# Patient Record
Sex: MALE | Race: OTHER | NOT HISPANIC OR LATINO
[De-identification: names, ages, dates, MRNs, and addresses within clinical notes are randomized per-mention and may not be internally consistent; named-entity substitution may affect disease eponyms.]

---

## 2020-04-08 ENCOUNTER — APPOINTMENT (OUTPATIENT)
Dept: CARDIOLOGY | Facility: CLINIC | Age: 70
End: 2020-04-08

## 2020-08-12 ENCOUNTER — NON-APPOINTMENT (OUTPATIENT)
Age: 70
End: 2020-08-12

## 2020-08-12 ENCOUNTER — APPOINTMENT (OUTPATIENT)
Dept: CARDIOLOGY | Facility: CLINIC | Age: 70
End: 2020-08-12
Payer: MEDICAID

## 2020-08-12 VITALS
BODY MASS INDEX: 31.76 KG/M2 | SYSTOLIC BLOOD PRESSURE: 119 MMHG | OXYGEN SATURATION: 97 % | HEART RATE: 92 BPM | DIASTOLIC BLOOD PRESSURE: 67 MMHG | WEIGHT: 185 LBS

## 2020-08-12 DIAGNOSIS — I48.91 UNSPECIFIED ATRIAL FIBRILLATION: ICD-10-CM

## 2020-08-12 PROCEDURE — 93000 ELECTROCARDIOGRAM COMPLETE: CPT

## 2020-08-12 PROCEDURE — 99205 OFFICE O/P NEW HI 60 MIN: CPT

## 2020-08-12 NOTE — PHYSICAL EXAM
[General Appearance - Well Developed] : well developed [Normal Appearance] : normal appearance [General Appearance - Well Nourished] : well nourished [Normal Conjunctiva] : the conjunctiva exhibited no abnormalities [Normal Jugular Venous A Waves Present] : normal jugular venous A waves present [Normal Jugular Venous V Waves Present] : normal jugular venous V waves present [Heart Sounds] : normal S1 and S2 [Murmurs] : no murmurs present [] : no respiratory distress [Auscultation Breath Sounds / Voice Sounds] : lungs were clear to auscultation bilaterally [Abdomen Soft] : soft [Abdomen Tenderness] : non-tender [Abnormal Walk] : normal gait [Skin Color & Pigmentation] : normal skin color and pigmentation [No Venous Stasis] : no venous stasis [Oriented To Time, Place, And Person] : oriented to person, place, and time [FreeTextEntry1] : DP +1 Right , left DP not felt , PT bilaterally not felt

## 2020-08-12 NOTE — HISTORY OF PRESENT ILLNESS
[FreeTextEntry1] : A 69 y/o male with PMhx of CAD previously followed by Dr. Bañuelos , who is referred to me by Dr. Gonzalez because of bilateral claudication\par He c/o of 1 year of right LE claudications (bilateral leg weakness , pain and inability to walk ) followed by Left LE claudications getting progressively worse , now happens with 5-10 steps \par Denies numbness , tingling or ulcers \par U/S showed bilateral moderate to severe SFA stenosis\par He denies chest pain, SOB , palpitations, dizziness or syncope \par

## 2020-08-12 NOTE — PHYSICAL EXAM
[General Appearance - Well Developed] : well developed [Normal Appearance] : normal appearance [General Appearance - Well Nourished] : well nourished [Normal Conjunctiva] : the conjunctiva exhibited no abnormalities [Normal Jugular Venous A Waves Present] : normal jugular venous A waves present [Heart Sounds] : normal S1 and S2 [Normal Jugular Venous V Waves Present] : normal jugular venous V waves present [Murmurs] : no murmurs present [] : no respiratory distress [Auscultation Breath Sounds / Voice Sounds] : lungs were clear to auscultation bilaterally [Abdomen Soft] : soft [Abdomen Tenderness] : non-tender [Abnormal Walk] : normal gait [Skin Color & Pigmentation] : normal skin color and pigmentation [Oriented To Time, Place, And Person] : oriented to person, place, and time [No Venous Stasis] : no venous stasis [FreeTextEntry1] : DP +1 Right , left DP not felt , PT bilaterally not felt

## 2020-08-12 NOTE — HISTORY OF PRESENT ILLNESS
[FreeTextEntry1] : A 71 y/o male with PMhx of CAD previously followed by Dr. Bañuelos , who is referred to me by Dr. Gonzalez because of bilateral claudication\par He c/o of 1 year of right LE claudications (bilateral leg weakness , pain and inability to walk ) followed by Left LE claudications getting progressively worse , now happens with 5-10 steps \par Denies numbness , tingling or ulcers \par U/S showed bilateral moderate to severe SFA stenosis\par He denies chest pain, SOB , palpitations, dizziness or syncope \par

## 2020-08-12 NOTE — ASSESSMENT
[FreeTextEntry1] : Bilateral LE intermittent claudications Ayla IIB , lifestyle limiting \par will order peripheral angiogram and angioplasty/stenting \par Given long history of CAD and inability to access exertional angina given limitations from LE claudication \par I suggest coronary angiogram to be done at the same times ( hx of LAD distal disease, proximal and mid cX diffuse disease and RCA  based on prior cardiologist assessment)

## 2020-09-12 DIAGNOSIS — Z01.818 ENCOUNTER FOR OTHER PREPROCEDURAL EXAMINATION: ICD-10-CM

## 2020-09-14 ENCOUNTER — APPOINTMENT (OUTPATIENT)
Dept: DISASTER EMERGENCY | Facility: CLINIC | Age: 70
End: 2020-09-14

## 2020-09-15 LAB — SARS-COV-2 N GENE NPH QL NAA+PROBE: NOT DETECTED

## 2020-09-17 ENCOUNTER — TRANSCRIPTION ENCOUNTER (OUTPATIENT)
Age: 70
End: 2020-09-17

## 2020-09-17 ENCOUNTER — OUTPATIENT (OUTPATIENT)
Dept: OUTPATIENT SERVICES | Facility: HOSPITAL | Age: 70
LOS: 1 days | End: 2020-09-17
Payer: MEDICARE

## 2020-09-17 VITALS
DIASTOLIC BLOOD PRESSURE: 74 MMHG | RESPIRATION RATE: 18 BRPM | SYSTOLIC BLOOD PRESSURE: 144 MMHG | TEMPERATURE: 98 F | HEART RATE: 103 BPM | OXYGEN SATURATION: 99 %

## 2020-09-17 VITALS
SYSTOLIC BLOOD PRESSURE: 134 MMHG | OXYGEN SATURATION: 99 % | RESPIRATION RATE: 18 BRPM | HEART RATE: 87 BPM | DIASTOLIC BLOOD PRESSURE: 81 MMHG

## 2020-09-17 DIAGNOSIS — I25.10 ATHEROSCLEROTIC HEART DISEASE OF NATIVE CORONARY ARTERY WITHOUT ANGINA PECTORIS: ICD-10-CM

## 2020-09-17 PROCEDURE — 80048 BASIC METABOLIC PNL TOTAL CA: CPT

## 2020-09-17 PROCEDURE — 86901 BLOOD TYPING SEROLOGIC RH(D): CPT

## 2020-09-17 PROCEDURE — 93005 ELECTROCARDIOGRAM TRACING: CPT

## 2020-09-17 PROCEDURE — C1769: CPT

## 2020-09-17 PROCEDURE — 86850 RBC ANTIBODY SCREEN: CPT

## 2020-09-17 PROCEDURE — 85610 PROTHROMBIN TIME: CPT

## 2020-09-17 PROCEDURE — C1894: CPT

## 2020-09-17 PROCEDURE — 85730 THROMBOPLASTIN TIME PARTIAL: CPT

## 2020-09-17 PROCEDURE — 93010 ELECTROCARDIOGRAM REPORT: CPT

## 2020-09-17 PROCEDURE — 99152 MOD SED SAME PHYS/QHP 5/>YRS: CPT

## 2020-09-17 PROCEDURE — 36415 COLL VENOUS BLD VENIPUNCTURE: CPT

## 2020-09-17 PROCEDURE — C1887: CPT

## 2020-09-17 PROCEDURE — 85025 COMPLETE CBC W/AUTO DIFF WBC: CPT

## 2020-09-17 PROCEDURE — 93458 L HRT ARTERY/VENTRICLE ANGIO: CPT

## 2020-09-17 PROCEDURE — 86900 BLOOD TYPING SEROLOGIC ABO: CPT

## 2020-09-17 PROCEDURE — 99153 MOD SED SAME PHYS/QHP EA: CPT

## 2020-09-17 RX ORDER — SIMVASTATIN 20 MG/1
1 TABLET, FILM COATED ORAL
Qty: 0 | Refills: 0 | DISCHARGE

## 2020-09-17 RX ORDER — WARFARIN SODIUM 2.5 MG/1
0.5 TABLET ORAL
Qty: 0 | Refills: 0 | DISCHARGE

## 2020-09-17 RX ORDER — WARFARIN SODIUM 2.5 MG/1
1 TABLET ORAL
Qty: 0 | Refills: 0 | DISCHARGE

## 2020-09-17 RX ORDER — WARFARIN SODIUM 2.5 MG/1
0.5 TABLET ORAL
Qty: 15 | Refills: 0
Start: 2020-09-17 | End: 2020-10-16

## 2020-09-17 RX ORDER — WARFARIN SODIUM 2.5 MG/1
1 TABLET ORAL
Qty: 30 | Refills: 0
Start: 2020-09-17 | End: 2020-10-16

## 2020-09-17 RX ORDER — SIMVASTATIN 20 MG/1
1 TABLET, FILM COATED ORAL
Qty: 90 | Refills: 2
Start: 2020-09-17 | End: 2021-06-13

## 2020-09-17 NOTE — H&P PST ADULT - NSICDXPASTMEDICALHX_GEN_ALL_CORE_FT
PAST MEDICAL HISTORY:  Atrial fibrillation     CAD (coronary artery disease)     HLD (hyperlipidemia)     HTN (hypertension)

## 2020-09-17 NOTE — DISCHARGE NOTE PROVIDER - HOSPITAL COURSE
69 yo male with PMHX of CAD (dLAD, porx and mid Circumflex diffuse disease and RCA  - prev cardiology assessment; used to follow with cardiology at [facility]), Atrial fibrillation on Coumadin, HTN, HLD who pre was referred by Dr. Gonzalez for evaluation of bilateral claudication. Patient has complained of right and left lower extremity weakness, pain and inability to walk which has been progressively worse. Per patient symptoms started on the right lower extremity and has progressed to the left lower extremity with associated limiting symptoms. Patient is not able to walk long distances and currently has not been able to walk in the past few days. Last documented ischemic evaluation (in [place]) 4/2011 was significant for normal NST and Normal LV systolic function on echocardiogram EF 60%. Per review of outpatient clinical documentation, pt had US LE which showed bilateral moderate to sever SFA stenosis. Patient to be further evaluated for bilateral lower extremity Bifemoral Angiogram, Iliac, Femoropopliteal Angioplasty / poss stent at a later date.  He is now scheduled for [facility] to eval progression of CAD.       s/p Left Heart Catheterization via RRA approach with Dr. Stein which showed:  Closed RCA, 80% Circumflex, Normal LAD - medical management recommended

## 2020-09-17 NOTE — H&P PST ADULT - ASSESSMENT
71 yo male with PMHX of CAD (dLAD, porx and mid Circumflex diffuse disease and RCA  - prev cardiology assessment; used to follow with cardiology at [facility]), Atrial fibrillation on Coumadin, HTN, HLD who pre was referred by Dr. Gonzalez for evaluation of bilateral claudication. Patient has complained of right and left lower extremity weakness, pain and inability to walk which has been progressively worse. Per patient symptoms started on the right lower extremity and has progressed to the left lower extremity with associated limiting symptoms. Patient is not able to walk long distances and currently has not been able to walk in the past few days. Last documented ischemic evaluation (in [place]) 4/2011 was significant for normal NST and Normal LV systolic function on echocardiogram EF 60%. Per review of outpatient clinical documentation, pt had US LE which showed bilateral moderate to sever SFA stenosis. He is now scheduled for bilateral lower extremity angiogram/ poss stent with Dr. Jean.        -Patient seen and examined  -Labs and EKG reviewed   -Pre-procedure teaching completed with patient, questions answered   -Informed consent to be obtained by attending   -received Asprin prior to procedure       69 yo male with PMHX of CAD (dLAD, porx and mid Circumflex diffuse disease and RCA  - prev cardiology assessment; used to follow with cardiology at [facility]), Atrial fibrillation on Coumadin, HTN, HLD who pre was referred by Dr. Gonzalez for evaluation of bilateral claudication. Patient has complained of right and left lower extremity weakness, pain and inability to walk which has been progressively worse. Per patient symptoms started on the right lower extremity and has progressed to the left lower extremity with associated limiting symptoms. Patient is not able to walk long distances and currently has not been able to walk in the past few days. Last documented ischemic evaluation (in [place]) 4/2011 was significant for normal NST and Normal LV systolic function on echocardiogram EF 60%. Per review of outpatient clinical documentation, pt had US LE which showed bilateral moderate to sever SFA stenosis. Patient to be further evaluated for bilateral lower extremity Bifemoral Angiogram, Iliac, Femoropopliteal Angioplasty / poss stent at a later date.  He is now scheduled for [facility] to eval progression of CAD.       -Patient seen and examined  -Labs and EKG reviewed   -Pre-procedure teaching completed with patient, questions answered   -Informed consent to be obtained by attending   -received Asprin prior to procedure

## 2020-09-17 NOTE — PROGRESS NOTE ADULT - SUBJECTIVE AND OBJECTIVE BOX
Department of Cardiology                                                  [facility]/[place] [address] [address]                                              Telephone: [phone]. Fax:[phone]                                                         Post Procedure Cardiac Cath NP Note       Narrative:    69 yo male with PMHX of CAD (dLAD, porx and mid Circumflex diffuse disease and RCA  - prev cardiology assessment; used to follow with cardiology at [facility]), Atrial fibrillation on Coumadin, HTN, HLD who pre was referred by Dr. Gonzalez for evaluation of bilateral claudication. Patient has complained of right and left lower extremity weakness, pain and inability to walk which has been progressively worse. Per patient symptoms started on the right lower extremity and has progressed to the left lower extremity with associated limiting symptoms. Patient is not able to walk long distances and currently has not been able to walk in the past few days. Last documented ischemic evaluation (in [place]) 4/2011 was significant for normal NST and Normal LV systolic function on echocardiogram EF 60%. Per review of outpatient clinical documentation, pt had US LE which showed bilateral moderate to sever SFA stenosis. Patient to be further evaluated for bilateral lower extremity Bifemoral Angiogram, Iliac, Femoropopliteal Angioplasty / poss stent at a later date.  He is now scheduled for [facility] to eval progression of CAD.     s/p Left Heart Catheterization via RRA approach with Dr. Jean              PAST MEDICAL & SURGICAL HISTORY:  HLD (hyperlipidemia)    HTN (hypertension)  CAD (coronary artery disease)  Atrial fibrillation      FAMILY HISTORY:  Family history of early CAD      Home Medications:  aspirin 300 mg oral tablet:  (17 Sep 2020 09:27)  isosorbide mononitrate 30 mg oral tablet, extended release: 1 tab(s) orally once a day (in the morning) (17 Sep 2020 09:27)  metoprolol tartrate 25 mg oral tablet: 0.5 tab(s) orally 2 times a day (17 Sep 2020 09:27)  pantoprazole 40 mg oral delayed release tablet: 1 tab(s) orally once a day (17 Sep 2020 09:27)  simvastatin 40 mg oral tablet: 1 tab(s) orally once a day (at bedtime) (17 Sep 2020 09:27)  warfarin 1 mg oral tablet: 1 tab(s) orally once a day (17 Sep 2020 09:27)  warfarin 5 mg oral tablet: 0.5 tab(s) orally once a day (17 Sep 2020 09:27)                     16.1   9.84  )-----------( 199      ( 17 Sep 2020 09:22 )             49.2     09-17    138  |  104  |  13.0  ----------------------------<  111<H>  4.7   |  23.0  |  0.74    Ca    9.3      17 Sep 2020 09:22      PT/INR - ( 17 Sep 2020 09:22 )   PT: 15.3 sec;   INR: 1.34 ratio    PTT - ( 17 Sep 2020 09:22 )  PTT:31.3 sec       PHYSICAL EXAM:  Vital Signs last 24 Hours   T(C): 36.7 (09-17-20 @ 09:32), Max: 36.7 (09-17-20 @ 09:32)  HR: 103 (09-17-20 @ 09:32) (103 - 103)  BP: 144/74 (09-17-20 @ 09:32) (144/74 - 144/74)  RR: 18 (09-17-20 @ 09:32) (18 - 18)  SpO2: 99% (09-17-20 @ 09:32) (99% - 99%)      Gen: Appears well in NAD  HEENT:  (-)icterus (-)pallor  CV: N S1 S2, RRR, no r/m/g,  (+)2 Pulses B/l  Resp:  Clear to auscultation B/L, normal effort  GI: (+) BS Soft, NT, ND  Lymph:  (-)Edema, (-)obvious lymphadenopathy  Skin: Warm to touch, Normal turgor  Psych: Appropriate mood and affect    TELEMETRY:    DIAGNOSTICS:    < from: Transthoracic Echocardiogram w/ Doppler (04.08.11 @ 08:17) >  Conclusions:  1. Mitral annular calcification, otherwise normal mitral  valve. Mild mitral regurgitation.  2. Normal trileaflet aortic valve. No aortic valve  regurgitation seen.  3. Mildly dilated left atrium for body surface area.  LA  volume index = 32 cc/m2.  4. Increased relative wall thickness with normal left  ventricular mass index, consistent with concentric left  ventricular remodeling.  5. Normal left ventricular systolic function. No segmental  wall motion abnormalities.  Ejection fraction 60%.  6. Inadequate tricuspid regurgitation Doppler envelope  precludes estimation of RVSP.  ------------------------------------------------------------------------  Confirmed on  4/8/2011 - 09:59:10 by Radha Suárez M.D.    < end of copied text >      Left Heart Catheterization - final report pending       ASSESSMENT AND PLAN:           -post cardiac cath orders  -radial or groin precautions  -continue current medical therapy  -DAPT (ASA and plavix)  -statin  -BB  -follow up outpt in 2 weeks Department of Cardiology                                                        [facility]/[place] [address] [address] [facility]                                                    Telephone: [phone]. Fax:[phone]                                                         Post Procedure Cardiac Cath NP Note       Narrative:    69 yo male with PMHX of CAD (dLAD, porx and mid Circumflex diffuse disease and RCA  - prev cardiology assessment; used to follow with cardiology at [facility]), Atrial fibrillation on Coumadin, HTN, HLD who pre was referred by Dr. Gonzalez for evaluation of bilateral claudication. Patient has complained of right and left lower extremity weakness, pain and inability to walk which has been progressively worse. Per patient symptoms started on the right lower extremity and has progressed to the left lower extremity with associated limiting symptoms. Patient is not able to walk long distances and currently has not been able to walk in the past few days. Last documented ischemic evaluation (in [place]) 4/2011 was significant for normal NST and Normal LV systolic function on echocardiogram EF 60%. Per review of outpatient clinical documentation, pt had US LE which showed bilateral moderate to sever SFA stenosis. Patient to be further evaluated for bilateral lower extremity Bifemoral Angiogram, Iliac, Femoropopliteal Angioplasty / poss stent at a later date.  He is now scheduled for [facility] to eval progression of CAD.     s/p Left Heart Catheterization via RRA approach with Dr. Stein which showed:  Closed RCA, 80% Circumflex, Normal LAD - medical management recommended   Medications used:  Versed IV 1 mg, FEntanyl IV 25 mcg, Verapamil IA 5 mg, Heparin 4000 units  Contrast used:  Omnipaque 45 ml                PAST MEDICAL & SURGICAL HISTORY:  HLD (hyperlipidemia)    HTN (hypertension)  CAD (coronary artery disease)  Atrial fibrillation      FAMILY HISTORY:  Family history of early CAD      Home Medications:  aspirin 300 mg oral tablet:  (17 Sep 2020 09:27)  isosorbide mononitrate 30 mg oral tablet, extended release: 1 tab(s) orally once a day (in the morning) (17 Sep 2020 09:27)  metoprolol tartrate 25 mg oral tablet: 0.5 tab(s) orally 2 times a day (17 Sep 2020 09:27)  pantoprazole 40 mg oral delayed release tablet: 1 tab(s) orally once a day (17 Sep 2020 09:27)  simvastatin 40 mg oral tablet: 1 tab(s) orally once a day (at bedtime) (17 Sep 2020 09:27)  warfarin 1 mg oral tablet: 1 tab(s) orally once a day (17 Sep 2020 09:27)  warfarin 5 mg oral tablet: 0.5 tab(s) orally once a day (17 Sep 2020 09:27)                     16.1   9.84  )-----------( 199      ( 17 Sep 2020 09:22 )             49.2     09-17    138  |  104  |  13.0  ----------------------------<  111<H>  4.7   |  23.0  |  0.74    Ca    9.3      17 Sep 2020 09:22      PT/INR - ( 17 Sep 2020 09:22 )   PT: 15.3 sec;   INR: 1.34 ratio    PTT - ( 17 Sep 2020 09:22 )  PTT:31.3 sec       PHYSICAL EXAM:  Vital Signs last 24 Hours   T(C): 36.7 (09-17-20 @ 09:32), Max: 36.7 (09-17-20 @ 09:32)  HR: 103 (09-17-20 @ 09:32) (103 - 103)  BP: 144/74 (09-17-20 @ 09:32) (144/74 - 144/74)  RR: 18 (09-17-20 @ 09:32) (18 - 18)  SpO2: 99% (09-17-20 @ 09:32) (99% - 99%)      Gen: Appears well in NAD  HEENT:  (-)icterus (-)pallor  CV: N S1 S2, RRR, no r/m/g,  (+)2 Pulses B/l  Resp:  Clear to auscultation B/L, normal effort  GI: (+) BS Soft, NT, ND  Lymph:  (-)Edema, (-)obvious lymphadenopathy  Skin: Warm to touch, Normal turgor  Psych: Appropriate mood and affect    TELEMETRY:    DIAGNOSTICS:    < from: Transthoracic Echocardiogram w/ Doppler (04.08.11 @ 08:17) >  Conclusions:  1. Mitral annular calcification, otherwise normal mitral  valve. Mild mitral regurgitation.  2. Normal trileaflet aortic valve. No aortic valve  regurgitation seen.  3. Mildly dilated left atrium for body surface area.  LA  volume index = 32 cc/m2.  4. Increased relative wall thickness with normal left  ventricular mass index, consistent with concentric left  ventricular remodeling.  5. Normal left ventricular systolic function. No segmental  wall motion abnormalities.  Ejection fraction 60%.  6. Inadequate tricuspid regurgitation Doppler envelope  precludes estimation of RVSP.  ------------------------------------------------------------------------  Confirmed on  4/8/2011 - 09:59:10 by Radha Suárez M.D.    < end of copied text >      Left Heart Catheterization - final report pending       ASSESSMENT AND PLAN:           -post cardiac cath orders  -radial or groin precautions  -continue current medical therapy  -DAPT (ASA and plavix)  -statin  -BB  -follow up outpt in 2 weeks Department of Cardiology                                                        [facility]/[place] [address] [facility]                                                    Telephone: [phone]. Fax:[phone]                                                         Post Procedure Cardiac Cath NP Note       Narrative:    71 yo male with PMHX of CAD (dLAD, porx and mid Circumflex diffuse disease and RCA  - prev cardiology assessment; used to follow with cardiology at [facility]), Atrial fibrillation on Coumadin, HTN, HLD who pre was referred by Dr. Gonzalez for evaluation of bilateral claudication. Patient has complained of right and left lower extremity weakness, pain and inability to walk which has been progressively worse. Per patient symptoms started on the right lower extremity and has progressed to the left lower extremity with associated limiting symptoms. Patient is not able to walk long distances and currently has not been able to walk in the past few days. Last documented ischemic evaluation (in [place]) 4/2011 was significant for normal NST and Normal LV systolic function on echocardiogram EF 60%. Per review of outpatient clinical documentation, pt had US LE which showed bilateral moderate to sever SFA stenosis. Patient to be further evaluated for bilateral lower extremity Bifemoral Angiogram, Iliac, Femoropopliteal Angioplasty / poss stent at a later date.  He is now scheduled for [facility] to eval progression of CAD.       s/p Left Heart Catheterization via RRA approach with Dr. Stein which showed:  Closed RCA, 80% Circumflex, Normal LAD - medical management recommended   Medications used:  Versed IV 1 mg, FEntanyl IV 25 mcg, Verapamil IA 5 mg, Heparin 4000 units  Contrast used:  Omnipaque 45 ml  Right Radial Artery approach/ RRA band in situ           PAST MEDICAL & SURGICAL HISTORY:  HLD (hyperlipidemia)    HTN (hypertension)  CAD (coronary artery disease)  Atrial fibrillation      FAMILY HISTORY:  Family history of early CAD      Home Medications:  aspirin 300 mg oral tablet:  (17 Sep 2020 09:27)  isosorbide mononitrate 30 mg oral tablet, extended release: 1 tab(s) orally once a day (in the morning) (17 Sep 2020 09:27)  metoprolol tartrate 25 mg oral tablet: 0.5 tab(s) orally 2 times a day (17 Sep 2020 09:27)  pantoprazole 40 mg oral delayed release tablet: 1 tab(s) orally once a day (17 Sep 2020 09:27)  simvastatin 40 mg oral tablet: 1 tab(s) orally once a day (at bedtime) (17 Sep 2020 09:27)  warfarin 1 mg oral tablet: 1 tab(s) orally once a day (17 Sep 2020 09:27)  warfarin 5 mg oral tablet: 0.5 tab(s) orally once a day (17 Sep 2020 09:27)                     16.1   9.84  )-----------( 199      ( 17 Sep 2020 09:22 )             49.2     09-17    138  |  104  |  13.0  ----------------------------<  111<H>  4.7   |  23.0  |  0.74    Ca    9.3      17 Sep 2020 09:22      PT/INR - ( 17 Sep 2020 09:22 )   PT: 15.3 sec;   INR: 1.34 ratio    PTT - ( 17 Sep 2020 09:22 )  PTT:31.3 sec       PHYSICAL EXAM:  Vital Signs last 24 Hours   T(C): 36.7 (09-17-20 @ 09:32), Max: 36.7 (09-17-20 @ 09:32)  HR: 103 (09-17-20 @ 09:32) (103 - 103)  BP: 144/74 (09-17-20 @ 09:32) (144/74 - 144/74)  RR: 18 (09-17-20 @ 09:32) (18 - 18)  SpO2: 99% (09-17-20 @ 09:32) (99% - 99%)      Gen: Appears well in NAD  HEENT:  (-)icterus (-)pallor  CV: N S1 S2, RRR, no r/m/g,  (+)2 Pulses B/l  Resp:  Clear to auscultation B/L, normal effort  GI: (+) BS Soft, NT, ND  Lymph:  (-)Edema, (-)obvious lymphadenopathy  Skin: Warm to touch, Normal turgor  Psych: Appropriate mood and affect    TELEMETRY:  Atrial Fibrillation     DIAGNOSTICS:    < from: Transthoracic Echocardiogram w/ Doppler (04.08.11 @ 08:17) >  Conclusions:  1. Mitral annular calcification, otherwise normal mitral  valve. Mild mitral regurgitation.  2. Normal trileaflet aortic valve. No aortic valve  regurgitation seen.  3. Mildly dilated left atrium for body surface area.  LA  volume index = 32 cc/m2.  4. Increased relative wall thickness with normal left  ventricular mass index, consistent with concentric left  ventricular remodeling.  5. Normal left ventricular systolic function. No segmental  wall motion abnormalities.  Ejection fraction 60%.  6. Inadequate tricuspid regurgitation Doppler envelope  precludes estimation of RVSP.  ------------------------------------------------------------------------  Confirmed on  4/8/2011 - 09:59:10 by Radha Suárez M.D.    < end of copied text >      Left Heart Catheterization - final report pending       ASSESSMENT AND PLAN:    71 yo male with PMHX of CAD (dLAD, porx and mid Circumflex diffuse disease and RCA  - prev cardiology assessment; used to follow with cardiology at [facility]), Atrial fibrillation on Coumadin, HTN, HLD who pre was referred by Dr. Gnozalez for evaluation of bilateral claudication. Patient has complained of right and left lower extremity weakness, pain and inability to walk which has been progressively worse. Per patient symptoms started on the right lower extremity and has progressed to the left lower extremity with associated limiting symptoms. Patient is not able to walk long distances and currently has not been able to walk in the past few days. Last documented ischemic evaluation (in [place]) 4/2011 was significant for normal NST and Normal LV systolic function on echocardiogram EF 60%. Per review of outpatient clinical documentation, pt had US LE which showed bilateral moderate to sever SFA stenosis. He is now scheduled for [facility] to eval progression of CAD.       -tolerated procedure well      -post cardiac cath orders  -s/p LHC which showed known CAD, medical management recommended   -right radial precautions;  RRA band in place to be removed at 1630  -outpatient evaluation of bilateral lower extremity Bifemoral Angiogram, Iliac, Femoropopliteal Angioplasty / poss stent at a later date  -follow up with Dr. Jean outpatient for LE angiogram

## 2020-09-17 NOTE — DISCHARGE NOTE PROVIDER - NSDCFUADDAPPT_GEN_ALL_CORE_FT
Please call Dr. Jean office to scheduled bilateral lower extremity angiogram.  Call Dr. Cortez office to schedule cardiology follow up.

## 2020-09-17 NOTE — DISCHARGE NOTE PROVIDER - NSDCFUADDINST_GEN_ALL_CORE_FT
Restricted use with no heavy lifting of affected arm for 48 hours.  No submerging the arm in water for 48 hours.  You may start showering today.  Call your doctor for any bleeding, swelling, loss of sensation in the hand or fingers, or fingers turning blue.  If heavy bleeding or large lumps form, hold pressure at the spot and come to the Emergency Room.    patient not a candidate for cardiac rehab secondary to:  No stents placed, medical management recommended

## 2020-09-17 NOTE — DISCHARGE NOTE PROVIDER - NSDCACTIVITY_GEN_ALL_CORE
Walking - Outdoors allowed/Showering allowed/Walking - Indoors allowed/Stairs allowed/Do not drive or operate machinery

## 2020-09-17 NOTE — H&P PST ADULT - REASON FOR ADMISSION
Bifemoral Angiogram Bifemoral Angiogram, Iliac, Femoropopliteal Angioplasty Left Heart Catheterization

## 2020-09-17 NOTE — H&P PST ADULT - RS GEN PE MLT RESP DETAILS PC
respirations non-labored/normal/airway patent/breath sounds equal/no rhonchi/clear to auscultation bilaterally/no rales

## 2020-09-17 NOTE — DISCHARGE NOTE PROVIDER - NSDCCPCAREPLAN_GEN_ALL_CORE_FT
PRINCIPAL DISCHARGE DIAGNOSIS  Diagnosis: Status post left heart catheterization (LHC)  Assessment and Plan of Treatment: Moderate CAD   Medical management recommended  High Dose statin ordered      SECONDARY DISCHARGE DIAGNOSES  Diagnosis: Claudication  Assessment and Plan of Treatment:

## 2020-09-17 NOTE — DISCHARGE NOTE PROVIDER - CARE PROVIDER_API CALL
Prashant Jean  CARDIOVASCULAR DISEASE  [address] [facility], [address] [id number]  Phone: [phone]  Fax: [phone]  Follow Up Time:     Nicolas Cortez  CARDIOVASCULAR DISEASE  [address] [place] [address], [address] [address]  Phone: [phone]  Fax: [phone]  Follow Up Time:

## 2020-09-17 NOTE — DISCHARGE NOTE NURSING/CASE MANAGEMENT/SOCIAL WORK - PATIENT PORTAL LINK FT
You can access the FollowMyHealth Patient Portal offered by [facility] by registering at the following website: [email or web address]. By joining [employer]’s FollowMyHealth portal, you will also be able to view your health information using other applications (apps) compatible with our system.

## 2020-09-17 NOTE — DISCHARGE NOTE PROVIDER - NSDCMRMEDTOKEN_GEN_ALL_CORE_FT
aspirin 300 mg oral tablet:   isosorbide mononitrate 30 mg oral tablet, extended release: 1 tab(s) orally once a day (in the morning)  metoprolol tartrate 25 mg oral tablet: 0.5 tab(s) orally 2 times a day  pantoprazole 40 mg oral delayed release tablet: 1 tab(s) orally once a day  simvastatin 40 mg oral tablet: 1 tab(s) orally once a day (at bedtime)  warfarin 1 mg oral tablet: 1 tab(s) orally once a day  warfarin 5 mg oral tablet: 0.5 tab(s) orally once a day   aspirin 300 mg oral tablet:   isosorbide mononitrate 30 mg oral tablet, extended release: 1 tab(s) orally once a day (in the morning)  metoprolol tartrate 25 mg oral tablet: 0.5 tab(s) orally 2 times a day  pantoprazole 40 mg oral delayed release tablet: 1 tab(s) orally once a day  simvastatin 80 mg oral tablet: 1 tab(s) orally once a day (at bedtime)  warfarin 1 mg oral tablet: 1 tab(s) orally once a day  warfarin 5 mg oral tablet: 0.5 tab(s) orally once a day

## 2020-09-17 NOTE — H&P PST ADULT - HISTORY OF PRESENT ILLNESS
Narrative:     69 yo male with PMHX of MI, CAD, Atrial fibrillation on Coumadin, HTN, HLD who pre was referred by Dr. Gonzalez for evaluation of bilateral claudication. Patient has complained of right and left lower extremity weakness, pain and inability to walk which has been progressively worse     ASA  Mallampati  GFR  Creat  Bleeding Risk  COVID-19  negative 9/15       Symptoms:        Angina (Class):        Ischemic Symptoms:     Heart Failure:        Systolic/Diastolic/Combined:        NYHA Class (within 2 weeks):     Assessment of LVEF (Must be within 6 months):       EF:  55%        Assessed by:  Echocardiogram        Date:   2/6/20     Prior Cardiac Interventions (LHC, stents, CABG):       PCI's (Date, Stents, Vessels):          CABG (Date, Grafts):     Noninvasive Testing:   Stress Test: Date:        Protocol:        Duration of Exercise:        Symptoms:        EKG Changes:        DTS:        Myocardial Imaging:        Risk Assessment (Low, Medium, High):     Echo (Date, Findings):     Antianginal Therapies:        Beta Blockers:         Calcium Channel Blockers:        Long Acting Nitrates:        Ranexa:     Associated Risk Factors:        Cerebrovascular Disease: N/A       Chronic Lung Disease: N/A       Peripheral Arterial Disease: N/A       Chronic Kidney Disease (if yes, what is GFR): N/A       Uncontrolled Diabetes (if yes, what is HgbA1C or FBS): N/A       Poorly Controlled Hypertension (if yes, what is SBP): N/A       Morbid Obesity (if yes, what is BMI): N/A       History of Recent Ventricular Arrhythmia: N/A       Inability to Ambulate Safely: N/A       Need for Therapeutic Anticoagulation: N/A       Antiplatelet or Contrast Allergy: N/A Narrative:     69 yo male with PMHX of CAD (dLAD, porx and mid Circumflex diffuse disease and RCA  - prev cardiology assessment; used to follow with Dr. Bañuelos at [facility]), Atrial fibrillation on Coumadin, HTN, HLD who pre was referred by Dr. Gonzalez for evaluation of bilateral claudication. Patient has complained of right and left lower extremity weakness, pain and inability to walk which has been progressively worse. Per patient symptoms started on the right lower extremity and has progressed to the left lower extremity with associated limiting symptoms. Patient is not able to walk long distances and currently has not been able to walk in the past few days. Last documented ischemic evaluation (in [place]) 4/2011 was significant for normal NST and Normal LV systolic function on echocardiogram EF 60%. Per review of outpatient clinical documentation, pt had US LE which showed bilateral moderate to sever SFA stenosis. He is now scheduled for bilateral lower extremity angiogram/ poss stent with Dr. Jean.      ASA  2`  Mallampati   2  GFR  94  Creat  0.74  Bleeding Risk   1.3%  COVID-19  negative 9/15       Symptoms:        Angina (Class): No        Ischemic Symptoms:   No     Heart Failure:        Systolic/Diastolic/Combined:   No        NYHA Class (within 2 weeks):     Assessment of LVEF (Must be within 6 months):       EF:  55%        Assessed by:  Echocardiogram        Date:   2/6/20     < from: Transthoracic Echocardiogram w/ Doppler (04.08.11 @ 08:17) >  Conclusions:  1. Mitral annular calcification, otherwise normal mitral  valve. Mild mitral regurgitation.  2. Normal trileaflet aortic valve. No aortic valve  regurgitation seen.  3. Mildly dilated left atrium for body surface area.  LA  volume index = 32 cc/m2.  4. Increased relative wall thickness with normal left  ventricular mass index, consistent with concentric left  ventricular remodeling.  5. Normal left ventricular systolic function. No segmental  wall motion abnormalities.  Ejection fraction 60%.  6. Inadequate tricuspid regurgitation Doppler envelope  precludes estimation of RVSP.  ------------------------------------------------------------------------  Confirmed on  4/8/2011 - 09:59:10 by Radha Suárez M.D.    < end of copied text >    Prior Cardiac Interventions (LHC, stents, CABG):       PCI's (Date, Stents, Vessels):          CABG (Date, Grafts):     Noninvasive Testing:   Stress Test: Date:   4/2011        Protocol:        Duration of Exercise:        Symptoms:        EKG Changes:        DTS:        Myocardial Imaging:   Normal study; no evidence of infarct or ischemia; small mild defect in basal inferiro wall suggestive of diaphragmatic attenuation         Risk Assessment (Low, Medium, High):     Antianginal Therapies:        Beta Blockers:         Calcium Channel Blockers:        Long Acting Nitrates:        Ranexa:     Associated Risk Factors:        Cerebrovascular Disease: N/A       Chronic Lung Disease: N/A       Peripheral Arterial Disease: N/A       Chronic Kidney Disease (if yes, what is GFR): N/A       Uncontrolled Diabetes (if yes, what is HgbA1C or FBS): N/A       Poorly Controlled Hypertension (if yes, what is SBP): N/A       Morbid Obesity (if yes, what is BMI): N/A       History of Recent Ventricular Arrhythmia: N/A       Inability to Ambulate Safely: N/A       Need for Therapeutic Anticoagulation: N/A       Antiplatelet or Contrast Allergy: N/A Narrative:     69 yo male with PMHX of CAD (dLAD, porx and mid Circumflex diffuse disease and RCA  - prev cardiology assessment; used to follow with cardiology at [facility]), Atrial fibrillation on Coumadin, HTN, HLD who pre was referred by Dr. Gonzalez for evaluation of bilateral claudication. Patient has complained of right and left lower extremity weakness, pain and inability to walk which has been progressively worse. Per patient symptoms started on the right lower extremity and has progressed to the left lower extremity with associated limiting symptoms. Patient is not able to walk long distances and currently has not been able to walk in the past few days. Last documented ischemic evaluation (in [place]) 4/2011 was significant for normal NST and Normal LV systolic function on echocardiogram EF 60%. Per review of outpatient clinical documentation, pt had US LE which showed bilateral moderate to sever SFA stenosis. He is now scheduled for bilateral lower extremity angiogram/ poss stent with Dr. Jean.      ASA  2`  Mallampati   2  GFR  94  Creat  0.74  Bleeding Risk   1.3%  COVID-19  negative 9/15       Symptoms:        Angina (Class): No        Ischemic Symptoms:   No     Heart Failure:        Systolic/Diastolic/Combined:   No        NYHA Class (within 2 weeks):     Assessment of LVEF (Must be within 6 months):       EF:  55%        Assessed by:  Echocardiogram        Date:   2/6/20     < from: Transthoracic Echocardiogram w/ Doppler (04.08.11 @ 08:17) >  Conclusions:  1. Mitral annular calcification, otherwise normal mitral  valve. Mild mitral regurgitation.  2. Normal trileaflet aortic valve. No aortic valve  regurgitation seen.  3. Mildly dilated left atrium for body surface area.  LA  volume index = 32 cc/m2.  4. Increased relative wall thickness with normal left  ventricular mass index, consistent with concentric left  ventricular remodeling.  5. Normal left ventricular systolic function. No segmental  wall motion abnormalities.  Ejection fraction 60%.  6. Inadequate tricuspid regurgitation Doppler envelope  precludes estimation of RVSP.  ------------------------------------------------------------------------  Confirmed on  4/8/2011 - 09:59:10 by Radha Suárez M.D.    < end of copied text >    Prior Cardiac Interventions (LHC, stents, CABG):       PCI's (Date, Stents, Vessels):          CABG (Date, Grafts):     Noninvasive Testing:   Stress Test: Date:   4/2011        Protocol:        Duration of Exercise:        Symptoms:        EKG Changes:        DTS:        Myocardial Imaging:   Normal study; no evidence of infarct or ischemia; small mild defect in basal inferiro wall suggestive of diaphragmatic attenuation         Risk Assessment (Low, Medium, High):     Antianginal Therapies:        Beta Blockers:         Calcium Channel Blockers:        Long Acting Nitrates:        Ranexa:     Associated Risk Factors:        Cerebrovascular Disease: N/A       Chronic Lung Disease: N/A       Peripheral Arterial Disease: N/A       Chronic Kidney Disease (if yes, what is GFR): N/A       Uncontrolled Diabetes (if yes, what is HgbA1C or FBS): N/A       Poorly Controlled Hypertension (if yes, what is SBP): N/A       Morbid Obesity (if yes, what is BMI): N/A       History of Recent Ventricular Arrhythmia: N/A       Inability to Ambulate Safely: N/A       Need for Therapeutic Anticoagulation: N/A       Antiplatelet or Contrast Allergy: N/A Narrative:     71 yo male with PMHX of CAD (dLAD, porx and mid Circumflex diffuse disease and RCA  - prev cardiology assessment; used to follow with cardiology at [facility]), Atrial fibrillation on Coumadin, HTN, HLD who pre was referred by Dr. Gonzalez for evaluation of bilateral claudication. Patient has complained of right and left lower extremity weakness, pain and inability to walk which has been progressively worse. Per patient symptoms started on the right lower extremity and has progressed to the left lower extremity with associated limiting symptoms. Patient is not able to walk long distances and currently has not been able to walk in the past few days. Last documented ischemic evaluation (in [place]) 4/2011 was significant for normal NST and Normal LV systolic function on echocardiogram EF 60%. Per review of outpatient clinical documentation, pt had US LE which showed bilateral moderate to sever SFA stenosis. Patient to be further evaluated for bilateral lower extremity Bifemoral Angiogram, Iliac, Femoropopliteal Angioplasty / poss stent at a later date.  He is now scheduled for [facility] to eval progression of CAD.       ASA  2`  Mallampati   2  GFR  94  Creat  0.74  Bleeding Risk   1.3%  COVID-19  negative 9/15       Symptoms:        Angina (Class): No        Ischemic Symptoms:   No     Heart Failure:        Systolic/Diastolic/Combined:   No        NYHA Class (within 2 weeks):     Assessment of LVEF (Must be within 6 months):       EF:  55%        Assessed by:  Echocardiogram        Date:   2/6/20     < from: Transthoracic Echocardiogram w/ Doppler (04.08.11 @ 08:17) >  Conclusions:  1. Mitral annular calcification, otherwise normal mitral  valve. Mild mitral regurgitation.  2. Normal trileaflet aortic valve. No aortic valve  regurgitation seen.  3. Mildly dilated left atrium for body surface area.  LA  volume index = 32 cc/m2.  4. Increased relative wall thickness with normal left  ventricular mass index, consistent with concentric left  ventricular remodeling.  5. Normal left ventricular systolic function. No segmental  wall motion abnormalities.  Ejection fraction 60%.  6. Inadequate tricuspid regurgitation Doppler envelope  precludes estimation of RVSP.  ------------------------------------------------------------------------  Confirmed on  4/8/2011 - 09:59:10 by Radha Suárez M.D.    < end of copied text >    Prior Cardiac Interventions (LHC, stents, CABG):       PCI's (Date, Stents, Vessels):          CABG (Date, Grafts):     Noninvasive Testing:   Stress Test: Date:   4/2011        Protocol:        Duration of Exercise:        Symptoms:        EKG Changes:        DTS:        Myocardial Imaging:   Normal study; no evidence of infarct or ischemia; small mild defect in basal inferiro wall suggestive of diaphragmatic attenuation         Risk Assessment (Low, Medium, High):     Antianginal Therapies:        Beta Blockers:         Calcium Channel Blockers:        Long Acting Nitrates:        Ranexa:     Associated Risk Factors:        Cerebrovascular Disease: N/A       Chronic Lung Disease: N/A       Peripheral Arterial Disease: N/A       Chronic Kidney Disease (if yes, what is GFR): N/A       Uncontrolled Diabetes (if yes, what is HgbA1C or FBS): N/A       Poorly Controlled Hypertension (if yes, what is SBP): N/A       Morbid Obesity (if yes, what is BMI): N/A       History of Recent Ventricular Arrhythmia: N/A       Inability to Ambulate Safely: N/A       Need for Therapeutic Anticoagulation: N/A       Antiplatelet or Contrast Allergy: N/A

## 2020-10-06 ENCOUNTER — APPOINTMENT (OUTPATIENT)
Dept: DISASTER EMERGENCY | Facility: CLINIC | Age: 70
End: 2020-10-06

## 2020-10-07 LAB — SARS-COV-2 N GENE NPH QL NAA+PROBE: NOT DETECTED

## 2020-10-07 NOTE — H&P PST ADULT - NSICDXPASTMEDICALHX_GEN_ALL_CORE_FT
PAST MEDICAL HISTORY:  Atrial fibrillation     CAD (coronary artery disease)     HLD (hyperlipidemia)     HTN (hypertension)     PVD (peripheral vascular disease)

## 2020-10-07 NOTE — H&P PST ADULT - HISTORY OF PRESENT ILLNESS
Narrative: 69 y/o M, former smoker, with PMHx of CAD (dLAD, porx and mid Circumflex diffuse disease and RCA ), Atrial fibrillation (on Coumadin), HTN, HLD now with progressive claudication which has now become lifestyle limiting; he presents today in anticipation for a Aortobifemoral angiography iliac femoropopliteal angio and possible stent placement with Dr. Jean.    Patient has complained of right and left lower extremity weakness, pain and inability to walk which has been progressively worse. Per patient, symptoms started on the right lower extremity and has progressed to the left lower extremity with associated limiting symptoms. Patient is not able to walk long distances and currently has not been able to walk in the past few days. Last documented ischemic evaluation (in [place]) 4/2011 was significant for normal NST and Normal LV systolic function on echocardiogram EF 60%. Per review of outpatient clinical documentation, pt had US LE which showed bilateral moderate to sever SFA stenosis.     Narrative: 69 y/o M, former smoker, with PMHx of CAD (dLAD, prox and mid Cx diffuse disease and RCA ), Atrial fibrillation (on Coumadin), HTN, HLD now with progressive claudication which has now become lifestyle limiting; he presents today in anticipation for a Aortobifemoral angiography iliac femoropopliteal angio and possible stent placement with Dr. Jean.    Patient has complained of right and left lower extremity weakness, pain and inability to walk which has been progressively worse. Per patient, symptoms started on the right lower extremity and has progressed to the left lower extremity with associated limiting symptoms. Patient is not able to walk long distances and currently has not been able to walk in the past few days. Last documented ischemic evaluation (in [place]) 4/2011 was significant for normal NST and Normal LV systolic function on echocardiogram EF 60%. Per review of outpatient clinical documentation, pt had US LE which showed bilateral moderate to severe SFA stenosis.    Cardiac Cath 9/17/20: RRA access (no sit complications; with Dr. Stein), coronary circulation is right dominant, LM normal, LAD normal, mCX 80%, ostial %; severe 2 vessel CAD no intervention  Echo 2/6/20: normal LV size and systolic function. Estimated LVEF 55%, RV size and systolic function, normal biatrial size, normal aortic valvular function, trace MR, trace TR, normal pulmonic valve       Narrative: 71 y/o M, former smoker, with PMHx of CAD (dLAD, prox and mid Cx diffuse disease and RCA ), Atrial fibrillation (on Coumadin), HTN, HLD now with progressive claudication which has now become lifestyle limiting; he presents today in anticipation for a Aortobifemoral angiography iliac femoropopliteal angio and possible stent placement with Dr. Jean.  pt present today with c/o severe claudication / pain Class II B  with  minimal exertion and lifestyle limiting     Patient has complained of right and left lower extremity weakness, pain and inability to walk which has been progressively worse. Per patient, symptoms started on the right lower extremity and has progressed to the left lower extremity with associated limiting symptoms. Patient is not able to walk long distances and currently has not been able to walk in the past few days. Last documented ischemic evaluation (in [place]) 4/2011 was significant for normal NST and Normal LV systolic function on echocardiogram EF 60%. Per review of outpatient clinical documentation, pt had US LE which showed bilateral moderate to severe SFA stenosis.    Cardiac Cath 9/17/20: RRA access (no sit complications; with Dr. Stein), coronary circulation is right dominant, LM normal, LAD normal, mCX 80%, ostial %; severe 2 vessel CAD no intervention  Echo 2/6/20: normal LV size and systolic function. Estimated LVEF 55%, RV size and systolic function, normal biatrial size, normal aortic valvular function, trace MR, trace TR, normal pulmonic valve    Mallampati 2  ASA 3   BRA   GFR     Last dose of  Warfarin 4 days ago

## 2020-10-07 NOTE — H&P PST ADULT - REASON FOR ADMISSION
Class II B Claudication which is now lifestyle limiting  Aortobifemoral angiography iliac femoropopliteal angio and possible stent placement

## 2020-10-08 ENCOUNTER — INPATIENT (INPATIENT)
Facility: HOSPITAL | Age: 70
LOS: 0 days | Discharge: HOME | DRG: 253 | End: 2020-10-09
Attending: INTERNAL MEDICINE | Admitting: INTERNAL MEDICINE
Payer: MEDICAID

## 2020-10-08 ENCOUNTER — TRANSCRIPTION ENCOUNTER (OUTPATIENT)
Age: 70
End: 2020-10-08

## 2020-10-08 VITALS
HEART RATE: 102 BPM | DIASTOLIC BLOOD PRESSURE: 63 MMHG | OXYGEN SATURATION: 98 % | RESPIRATION RATE: 16 BRPM | TEMPERATURE: 98 F | SYSTOLIC BLOOD PRESSURE: 145 MMHG

## 2020-10-08 DIAGNOSIS — I73.9 PERIPHERAL VASCULAR DISEASE, UNSPECIFIED: ICD-10-CM

## 2020-10-08 LAB
ANION GAP SERPL CALC-SCNC: 14 MMOL/L — SIGNIFICANT CHANGE UP (ref 5–17)
APTT BLD: 38.1 SEC — HIGH (ref 27.5–35.5)
BLD GP AB SCN SERPL QL: SIGNIFICANT CHANGE UP
BUN SERPL-MCNC: 11 MG/DL — SIGNIFICANT CHANGE UP (ref 8–20)
CALCIUM SERPL-MCNC: 9 MG/DL — SIGNIFICANT CHANGE UP (ref 8.6–10.2)
CHLORIDE SERPL-SCNC: 103 MMOL/L — SIGNIFICANT CHANGE UP (ref 98–107)
CO2 SERPL-SCNC: 22 MMOL/L — SIGNIFICANT CHANGE UP (ref 22–29)
CREAT SERPL-MCNC: 0.7 MG/DL — SIGNIFICANT CHANGE UP (ref 0.5–1.3)
GLUCOSE SERPL-MCNC: 97 MG/DL — SIGNIFICANT CHANGE UP (ref 70–99)
HCT VFR BLD CALC: 48.4 % — SIGNIFICANT CHANGE UP (ref 39–50)
HGB BLD-MCNC: 15.9 G/DL — SIGNIFICANT CHANGE UP (ref 13–17)
INR BLD: 1.68 RATIO — HIGH (ref 0.88–1.16)
MAGNESIUM SERPL-MCNC: 1.9 MG/DL — SIGNIFICANT CHANGE UP (ref 1.6–2.6)
MCHC RBC-ENTMCNC: 29.4 PG — SIGNIFICANT CHANGE UP (ref 27–34)
MCHC RBC-ENTMCNC: 32.9 GM/DL — SIGNIFICANT CHANGE UP (ref 32–36)
MCV RBC AUTO: 89.6 FL — SIGNIFICANT CHANGE UP (ref 80–100)
PLATELET # BLD AUTO: 244 K/UL — SIGNIFICANT CHANGE UP (ref 150–400)
POTASSIUM SERPL-MCNC: 3.9 MMOL/L — SIGNIFICANT CHANGE UP (ref 3.5–5.3)
POTASSIUM SERPL-SCNC: 3.9 MMOL/L — SIGNIFICANT CHANGE UP (ref 3.5–5.3)
PROTHROM AB SERPL-ACNC: 19 SEC — HIGH (ref 10.6–13.6)
RBC # BLD: 5.4 M/UL — SIGNIFICANT CHANGE UP (ref 4.2–5.8)
RBC # FLD: 13.4 % — SIGNIFICANT CHANGE UP (ref 10.3–14.5)
SODIUM SERPL-SCNC: 139 MMOL/L — SIGNIFICANT CHANGE UP (ref 135–145)
WBC # BLD: 10.07 K/UL — SIGNIFICANT CHANGE UP (ref 3.8–10.5)
WBC # FLD AUTO: 10.07 K/UL — SIGNIFICANT CHANGE UP (ref 3.8–10.5)

## 2020-10-08 PROCEDURE — 37223: CPT

## 2020-10-08 PROCEDURE — 37221: CPT | Mod: RT,59

## 2020-10-08 PROCEDURE — 75716 ARTERY X-RAYS ARMS/LEGS: CPT | Mod: 26,XU

## 2020-10-08 PROCEDURE — 99152 MOD SED SAME PHYS/QHP 5/>YRS: CPT

## 2020-10-08 PROCEDURE — 93010 ELECTROCARDIOGRAM REPORT: CPT

## 2020-10-08 RX ORDER — CLOPIDOGREL BISULFATE 75 MG/1
75 TABLET, FILM COATED ORAL DAILY
Refills: 0 | Status: DISCONTINUED | OUTPATIENT
Start: 2020-10-09 | End: 2020-10-09

## 2020-10-08 RX ORDER — CILOSTAZOL 100 MG/1
50 TABLET ORAL
Refills: 0 | Status: DISCONTINUED | OUTPATIENT
Start: 2020-10-08 | End: 2020-10-09

## 2020-10-08 RX ORDER — ATORVASTATIN CALCIUM 80 MG/1
40 TABLET, FILM COATED ORAL AT BEDTIME
Refills: 0 | Status: DISCONTINUED | OUTPATIENT
Start: 2020-10-08 | End: 2020-10-09

## 2020-10-08 RX ORDER — ISOSORBIDE MONONITRATE 60 MG/1
30 TABLET, EXTENDED RELEASE ORAL DAILY
Refills: 0 | Status: DISCONTINUED | OUTPATIENT
Start: 2020-10-08 | End: 2020-10-09

## 2020-10-08 RX ORDER — ASPIRIN/CALCIUM CARB/MAGNESIUM 324 MG
81 TABLET ORAL ONCE
Refills: 0 | Status: COMPLETED | OUTPATIENT
Start: 2020-10-08 | End: 2020-10-08

## 2020-10-08 RX ORDER — ASPIRIN/CALCIUM CARB/MAGNESIUM 324 MG
81 TABLET ORAL DAILY
Refills: 0 | Status: DISCONTINUED | OUTPATIENT
Start: 2020-10-09 | End: 2020-10-09

## 2020-10-08 RX ORDER — METOPROLOL TARTRATE 50 MG
25 TABLET ORAL
Refills: 0 | Status: DISCONTINUED | OUTPATIENT
Start: 2020-10-08 | End: 2020-10-09

## 2020-10-08 RX ORDER — PANTOPRAZOLE SODIUM 20 MG/1
40 TABLET, DELAYED RELEASE ORAL
Refills: 0 | Status: DISCONTINUED | OUTPATIENT
Start: 2020-10-08 | End: 2020-10-09

## 2020-10-08 RX ORDER — CLOPIDOGREL BISULFATE 75 MG/1
1 TABLET, FILM COATED ORAL
Qty: 90 | Refills: 3
Start: 2020-10-08 | End: 2021-10-02

## 2020-10-08 RX ADMIN — Medication 81 MILLIGRAM(S): at 09:50

## 2020-10-08 RX ADMIN — ISOSORBIDE MONONITRATE 30 MILLIGRAM(S): 60 TABLET, EXTENDED RELEASE ORAL at 22:15

## 2020-10-08 RX ADMIN — ATORVASTATIN CALCIUM 40 MILLIGRAM(S): 80 TABLET, FILM COATED ORAL at 22:15

## 2020-10-08 RX ADMIN — CILOSTAZOL 50 MILLIGRAM(S): 100 TABLET ORAL at 17:39

## 2020-10-08 RX ADMIN — Medication 25 MILLIGRAM(S): at 17:39

## 2020-10-08 NOTE — DISCHARGE NOTE PROVIDER - CARE PROVIDER_API CALL
Nicolas Cortez  CARDIOVASCULAR DISEASE  [address] [facility], [address]  [place] [address]  Phone: [phone]  Fax: [phone]  Follow Up Time:     Jeevan Mercer  SURGERY  [address] [facility], [address] [place] [address]  Phone: [phone]  Fax: [phone]  Follow Up Time:

## 2020-10-08 NOTE — PROGRESS NOTE ADULT - SUBJECTIVE AND OBJECTIVE BOX
Department of Cardiology                                                                  [facility]/[place] [address] [address] [facility]                                                            Telephone: [phone]. Fax:[phone]                                                                                         PERIPHERAL NOTE     70yMale S/P lower extremity angiography as stated below.  The patient denies chest pain, SOB, leg/foot pain or groin pain. done via RFA     Left Lower Extremity       Iliac: common one stent 9x38       external iliac : two stents 8x26 and 8x37          Right Lower Extremity   common Iliac: 100% occluded           HPI:  Narrative: 69 y/o M, former smoker, with PMHx of CAD (dLAD, prox and mid Cx diffuse disease and RCA ), Atrial fibrillation (on Coumadin), HTN, HLD now with progressive claudication which has now become lifestyle limiting; he presents today in anticipation for a Aortobifemoral angiography iliac femoropopliteal angio and possible stent placement with Dr. Jean.  pt present today with c/o severe claudication / pain Class II B  with  minimal exertion and lifestyle limiting     Patient has complained of right and left lower extremity weakness, pain and inability to walk which has been progressively worse. Per patient, symptoms started on the right lower extremity and has progressed to the left lower extremity with associated limiting symptoms. Patient is not able to walk long distances and currently has not been able to walk in the past few days. Last documented ischemic evaluation (in [place]) 4/2011 was significant for normal NST and Normal LV systolic function on echocardiogram EF 60%. Per review of outpatient clinical documentation, pt had US LE which showed bilateral moderate to severe SFA stenosis.    Cardiac Cath 9/17/20: RRA access (no sit complications; with Dr. Stein), coronary circulation is right dominant, LM normal, LAD normal, mCX 80%, ostial %; severe 2 vessel CAD no intervention  Echo 2/6/20: normal LV size and systolic function. Estimated LVEF 55%, RV size and systolic function, normal biatrial size, normal aortic valvular function, trace MR, trace TR, normal pulmonic valve      Last dose of  Warfarin 4 days ago   (07 Oct 2020 16:09)      General: Awake, alert, oriented, in no acute distress   Chest: CTA, S1, S2, RRR  Right Groin: Right femoral artery sheath in place , soft, no bleeding, no hematoma  Extremities: No edema  Pulses:        Right: PP palpable        Left: palpable     Labs:                         15.9   10.07 )-----------( 244      ( 08 Oct 2020 08:38 )             48.4     10-08    139  |  103  |  11.0  ----------------------------<  97  3.9   |  22.0  |  0.70    Ca    9.0      08 Oct 2020 08:38  Mg     1.9     10-08      PT/INR - ( 08 Oct 2020 08:38 )   PT: 19.0 sec;   INR: 1.68 ratio         PTT - ( 08 Oct 2020 08:38 )  PTT:38.1 sec Department of Cardiology                                                                  [facility]/[place] [address]  [place]                                                            Telephone: [phone]. Fax:[phone]                                                                                         PERIPHERAL NOTE     70yMale S/P lower extremity angiography as stated below.  The patient denies chest pain, SOB, leg/foot pain or groin pain. done via RFA     Left Lower Extremity       Iliac: common one stent 9x38       external iliac : two stents 8x26 and 8x37        Right Lower Extremity   common Iliac: 100% occluded     Medications during  case :   Versed 2 mg   Fentanyl 100mcg   Ancef 2 grams   Plavix 600 mg   heparin 8,000 units   Omnipaque 180cc          HPI:  Narrative: 71 y/o M, former smoker, with PMHx of CAD (dLAD, prox and mid Cx diffuse disease and RCA ), Atrial fibrillation (on Coumadin), HTN, HLD now with progressive claudication which has now become lifestyle limiting; he presents today in anticipation for a Aortobifemoral angiography iliac femoropopliteal angio and possible stent placement with Dr. Jean.  pt present today with c/o severe claudication / pain Class II B  with  minimal exertion and lifestyle limiting     Patient has complained of right and left lower extremity weakness, pain and inability to walk which has been progressively worse. Per patient, symptoms started on the right lower extremity and has progressed to the left lower extremity with associated limiting symptoms. Patient is not able to walk long distances and currently has not been able to walk in the past few days. Last documented ischemic evaluation (in [place]) 4/2011 was significant for normal NST and Normal LV systolic function on echocardiogram EF 60%. Per review of outpatient clinical documentation, pt had US LE which showed bilateral moderate to severe SFA stenosis.    Cardiac Cath 9/17/20: RRA access (no sit complications; with Dr. Stein), coronary circulation is right dominant, LM normal, LAD normal, mCX 80%, ostial %; severe 2 vessel CAD no intervention  Echo 2/6/20: normal LV size and systolic function. Estimated LVEF 55%, RV size and systolic function, normal biatrial size, normal aortic valvular function, trace MR, trace TR, normal pulmonic valve      Last dose of  Warfarin 4 days ago   (07 Oct 2020 16:09)      General: Awake, alert, oriented, in no acute distress   Chest: CTA, S1, S2, RRR  Right Groin: Right femoral artery sheath in place , soft, no bleeding, no hematoma  Extremities: No edema  Pulses:        Right: PP palpable        Left: palpable     Labs:                         15.9   10.07 )-----------( 244      ( 08 Oct 2020 08:38 )             48.4     10-08    139  |  103  |  11.0  ----------------------------<  97  3.9   |  22.0  |  0.70    Ca    9.0      08 Oct 2020 08:38  Mg     1.9     10-08      PT/INR - ( 08 Oct 2020 08:38 )   PT: 19.0 sec;   INR: 1.68 ratio         PTT - ( 08 Oct 2020 08:38 )  PTT:38.1 sec

## 2020-10-08 NOTE — PROGRESS NOTE ADULT - SUBJECTIVE AND OBJECTIVE BOX
NP sheath removal note     s/p LHC and stent     Pt tolerated R femoral #  7  sheath removal , manual pressure held for30     with good hemostasis, no evidence of bleeding area remains soft non- tender , no hematoma + peripheral pulses    T(C): 36.6 (10-08-20 @ 08:53), Max: 36.6 (10-08-20 @ 08:53)  HR: 103 (10-08-20 @ 15:15) (102 - 106)  BP: 148/70 (10-08-20 @ 15:15) (140/70 - 148/76)  RR: 15 (10-08-20 @ 15:15) (15 - 16)  SpO2: 98% (10-08-20 @ 15:15) (97% - 98%)    A/P peripheral stent x 3 iliac   - Maintain bedrest for 4     hours  Flat for 2 post sheath removal   - OOB  with assistance  if remains stable   - Post procedure vital, neuro and site checks as per routine

## 2020-10-08 NOTE — DISCHARGE NOTE PROVIDER - NSDCCPCAREPLAN_GEN_ALL_CORE_FT
PRINCIPAL DISCHARGE DIAGNOSIS  Diagnosis: S/P peripheral artery angioplasty with stent placement  Assessment and Plan of Treatment: 3 stents   Asprin and plavix daily   follow up with Dr quezada   Follow up with Dr Mercer

## 2020-10-08 NOTE — PROGRESS NOTE ADULT - ASSESSMENT
70yMale S/P lower extremity angiography as stated below.  The patient denies chest pain, SOB, leg/foot pain or groin pain. done via RFA     Left Lower Extremity       Iliac: common one stent 9x38       external iliac : two stents 8x26 and 8x37    Right Lower Extremity   common Iliac: 100% occluded     Pt tolerated procedure well  a/p PVD with intervention  with 3 Stent   Post orders and observations   Remove sheath once ACT less the 180   Bedrest post sheath removal   to Start on 10/9 am   ASA81 mg po daily   Plavix 75 mg po daily   Restart coumadin 10/9   Groin precautions   Left femoral iliac to be evaluated by Vascular surgery for future fem/fem bypass   Am Labs     70yMale S/P lower extremity angiography as stated below.  The patient denies chest pain, SOB, leg/foot pain or groin pain. done via RFA     Left Lower Extremity       Iliac: common one stent 9x38       external iliac : two stents 8x26 and 8x37    Right Lower Extremity   common Iliac: 100% occluded     Pt tolerated procedure well admit to hospital due to groin access obesity , comorbidities of CAD , and multiple stents to iliac blockage with large amount of dye load 185cc Omnipaque and need for triple therapy   a/p PVD with intervention  with 3 Stents to iliac   Post orders and observations   Remove sheath once ACT less the 180   Bedrest post sheath removal   to Start on 10/9 am   ASA81 mg po daily   Plavix 75 mg po daily   Restart coumadin 10/9   Groin precautions   Left femoral iliac to be evaluated by Vascular surgery for future fem/fem bypass    Dr Mercer attended    Am Labs  reviewed with patient and daughter on phone plan of care   smoking cessation reinforced support given

## 2020-10-08 NOTE — ASU PATIENT PROFILE, ADULT - PMH
Atrial fibrillation    CAD (coronary artery disease)    HLD (hyperlipidemia)    HTN (hypertension)    PVD (peripheral vascular disease)

## 2020-10-08 NOTE — DISCHARGE NOTE PROVIDER - NSDCMRMEDTOKEN_GEN_ALL_CORE_FT
aspirin 300 mg oral tablet:   cilostazol 50 mg oral tablet: 1 tab(s) orally 2 times a day  clopidogrel 75 mg oral tablet: 1 tab(s) orally once a day  isosorbide mononitrate 30 mg oral tablet, extended release: 1 tab(s) orally once a day (in the morning)  metoprolol tartrate 25 mg oral tablet: 0.5 tab(s) orally 2 times a day  pantoprazole 40 mg oral delayed release tablet: 1 tab(s) orally once a day  simvastatin 80 mg oral tablet: 1 tab(s) orally once a day (at bedtime)  warfarin 1 mg oral tablet: 1 tab(s) orally once a day  warfarin 5 mg oral tablet: 0.5 tab(s) orally once a day   aspirin 300 mg oral tablet:   clopidogrel 75 mg oral tablet: 1 tab(s) orally once a day  isosorbide mononitrate 30 mg oral tablet, extended release: 1 tab(s) orally once a day (in the morning)  metoprolol tartrate 25 mg oral tablet: 0.5 tab(s) orally 2 times a day  pantoprazole 40 mg oral delayed release tablet: 1 tab(s) orally once a day  simvastatin 80 mg oral tablet: 1 tab(s) orally once a day (at bedtime)  warfarin 1 mg oral tablet: 1 tab(s) orally once a day  warfarin 5 mg oral tablet: 0.5 tab(s) orally once a day   aspirin 81 mg oral delayed release tablet: 1 tab(s) orally once a day  clopidogrel 75 mg oral tablet: 1 tab(s) orally once a day  isosorbide mononitrate 30 mg oral tablet, extended release: 1 tab(s) orally once a day (in the morning)  metoprolol tartrate 25 mg oral tablet: 0.5 tab(s) orally 2 times a day  pantoprazole 40 mg oral delayed release tablet: 1 tab(s) orally once a day  simvastatin 80 mg oral tablet: 1 tab(s) orally once a day (at bedtime)  warfarin 1 mg oral tablet: 1 tab(s) orally once a day  warfarin 5 mg oral tablet: 0.5 tab(s) orally once a day

## 2020-10-08 NOTE — DISCHARGE NOTE PROVIDER - CARE PROVIDERS DIRECT ADDRESSES
[email or web address]

## 2020-10-08 NOTE — DISCHARGE NOTE PROVIDER - HOSPITAL COURSE
s/p 3 Stents to  left iliac   ASA 81 mg po daily   Plavix 75 mg po daily   Restart coumadin s/p 3 Stents to  left iliac   ASA 81 mg po daily   Plavix 75 mg po daily   Restart coumadin     Discontinue Pletal at this time due to increased risk of bleeding complicat    Will Return in 2 weeks for Fem-Pop Bipass of the RLE with Dr Mercer

## 2020-10-09 ENCOUNTER — TRANSCRIPTION ENCOUNTER (OUTPATIENT)
Age: 70
End: 2020-10-09

## 2020-10-09 VITALS
TEMPERATURE: 98 F | OXYGEN SATURATION: 98 % | HEART RATE: 104 BPM | SYSTOLIC BLOOD PRESSURE: 115 MMHG | DIASTOLIC BLOOD PRESSURE: 63 MMHG | RESPIRATION RATE: 16 BRPM

## 2020-10-09 LAB
ALBUMIN SERPL ELPH-MCNC: 3.3 G/DL — SIGNIFICANT CHANGE UP (ref 3.3–5.2)
ALP SERPL-CCNC: 83 U/L — SIGNIFICANT CHANGE UP (ref 40–120)
ALT FLD-CCNC: 13 U/L — SIGNIFICANT CHANGE UP
ANION GAP SERPL CALC-SCNC: 10 MMOL/L — SIGNIFICANT CHANGE UP (ref 5–17)
AST SERPL-CCNC: 15 U/L — SIGNIFICANT CHANGE UP
BASOPHILS # BLD AUTO: 0.07 K/UL — SIGNIFICANT CHANGE UP (ref 0–0.2)
BASOPHILS NFR BLD AUTO: 0.8 % — SIGNIFICANT CHANGE UP (ref 0–2)
BILIRUB SERPL-MCNC: 0.7 MG/DL — SIGNIFICANT CHANGE UP (ref 0.4–2)
BUN SERPL-MCNC: 10 MG/DL — SIGNIFICANT CHANGE UP (ref 8–20)
CALCIUM SERPL-MCNC: 8.4 MG/DL — LOW (ref 8.6–10.2)
CHLORIDE SERPL-SCNC: 102 MMOL/L — SIGNIFICANT CHANGE UP (ref 98–107)
CO2 SERPL-SCNC: 23 MMOL/L — SIGNIFICANT CHANGE UP (ref 22–29)
CREAT SERPL-MCNC: 0.73 MG/DL — SIGNIFICANT CHANGE UP (ref 0.5–1.3)
EOSINOPHIL # BLD AUTO: 0.39 K/UL — SIGNIFICANT CHANGE UP (ref 0–0.5)
EOSINOPHIL NFR BLD AUTO: 4.2 % — SIGNIFICANT CHANGE UP (ref 0–6)
GLUCOSE SERPL-MCNC: 107 MG/DL — HIGH (ref 70–99)
HCT VFR BLD CALC: 45.2 % — SIGNIFICANT CHANGE UP (ref 39–50)
HGB BLD-MCNC: 14.9 G/DL — SIGNIFICANT CHANGE UP (ref 13–17)
IMM GRANULOCYTES NFR BLD AUTO: 0.2 % — SIGNIFICANT CHANGE UP (ref 0–1.5)
LYMPHOCYTES # BLD AUTO: 2.61 K/UL — SIGNIFICANT CHANGE UP (ref 1–3.3)
LYMPHOCYTES # BLD AUTO: 28.4 % — SIGNIFICANT CHANGE UP (ref 13–44)
MCHC RBC-ENTMCNC: 29.2 PG — SIGNIFICANT CHANGE UP (ref 27–34)
MCHC RBC-ENTMCNC: 33 GM/DL — SIGNIFICANT CHANGE UP (ref 32–36)
MCV RBC AUTO: 88.6 FL — SIGNIFICANT CHANGE UP (ref 80–100)
MONOCYTES # BLD AUTO: 1.08 K/UL — HIGH (ref 0–0.9)
MONOCYTES NFR BLD AUTO: 11.7 % — SIGNIFICANT CHANGE UP (ref 2–14)
NEUTROPHILS # BLD AUTO: 5.03 K/UL — SIGNIFICANT CHANGE UP (ref 1.8–7.4)
NEUTROPHILS NFR BLD AUTO: 54.7 % — SIGNIFICANT CHANGE UP (ref 43–77)
PLATELET # BLD AUTO: 215 K/UL — SIGNIFICANT CHANGE UP (ref 150–400)
POTASSIUM SERPL-MCNC: 3.9 MMOL/L — SIGNIFICANT CHANGE UP (ref 3.5–5.3)
POTASSIUM SERPL-SCNC: 3.9 MMOL/L — SIGNIFICANT CHANGE UP (ref 3.5–5.3)
PROT SERPL-MCNC: 6.5 G/DL — LOW (ref 6.6–8.7)
RBC # BLD: 5.1 M/UL — SIGNIFICANT CHANGE UP (ref 4.2–5.8)
RBC # FLD: 13.2 % — SIGNIFICANT CHANGE UP (ref 10.3–14.5)
SODIUM SERPL-SCNC: 135 MMOL/L — SIGNIFICANT CHANGE UP (ref 135–145)
WBC # BLD: 9.2 K/UL — SIGNIFICANT CHANGE UP (ref 3.8–10.5)
WBC # FLD AUTO: 9.2 K/UL — SIGNIFICANT CHANGE UP (ref 3.8–10.5)

## 2020-10-09 RX ORDER — ASPIRIN/CALCIUM CARB/MAGNESIUM 324 MG
0 TABLET ORAL
Qty: 0 | Refills: 0 | DISCHARGE

## 2020-10-09 RX ORDER — CLOPIDOGREL BISULFATE 75 MG/1
1 TABLET, FILM COATED ORAL
Qty: 90 | Refills: 3
Start: 2020-10-09 | End: 2021-10-03

## 2020-10-09 RX ORDER — CILOSTAZOL 100 MG/1
1 TABLET ORAL
Qty: 0 | Refills: 0 | DISCHARGE

## 2020-10-09 RX ADMIN — Medication 25 MILLIGRAM(S): at 05:55

## 2020-10-09 RX ADMIN — CILOSTAZOL 50 MILLIGRAM(S): 100 TABLET ORAL at 05:55

## 2020-10-09 RX ADMIN — ISOSORBIDE MONONITRATE 30 MILLIGRAM(S): 60 TABLET, EXTENDED RELEASE ORAL at 09:07

## 2020-10-09 RX ADMIN — Medication 81 MILLIGRAM(S): at 09:07

## 2020-10-09 RX ADMIN — PANTOPRAZOLE SODIUM 40 MILLIGRAM(S): 20 TABLET, DELAYED RELEASE ORAL at 05:55

## 2020-10-09 RX ADMIN — CLOPIDOGREL BISULFATE 75 MILLIGRAM(S): 75 TABLET, FILM COATED ORAL at 09:07

## 2020-10-09 NOTE — DISCHARGE NOTE NURSING/CASE MANAGEMENT/SOCIAL WORK - NSDCPEPTCOWAR_GEN_ALL_CORE
Warfarin/Coumadin - Follow up monitoring/Warfarin/Coumadin - Compliance/Warfarin/Coumadin - Potential for adverse drug reactions and interactions/Warfarin/Coumadin - Dietary Advice

## 2020-10-09 NOTE — PROGRESS NOTE ADULT - SUBJECTIVE AND OBJECTIVE BOX
SUBJECTIVE:  Cardiology NP POST [facility]:  Pt seen post procedure     HPI:  Narrative: 71 y/o M, former smoker, with PMHx of CAD (dLAD, prox and mid Cx diffuse disease and RCA ), Atrial fibrillation (on Coumadin), HTN, HLD now with progressive claudication which has now become lifestyle limiting; he presents today in anticipation for a Aortobifemoral angiography iliac femoropopliteal angio and possible stent placement with Dr. Jean.  pt present today with c/o severe claudication / pain Class II B  with  minimal exertion and lifestyle limiting     Patient has complained of right and left lower extremity weakness, pain and inability to walk which has been progressively worse. Per patient, symptoms started on the right lower extremity and has progressed to the left lower extremity with associated limiting symptoms. Patient is not able to walk long distances and currently has not been able to walk in the past few days. Last documented ischemic evaluation (in [place]) 4/2011 was significant for normal NST and Normal LV systolic function on echocardiogram EF 60%. Per review of outpatient clinical documentation, pt had US LE which showed bilateral moderate to severe SFA stenosis.    Cardiac Cath 9/17/20: RRA access (no sit complications; with Dr. Stein), coronary circulation is right dominant, LM normal, LAD normal, mCX 80%, ostial %; severe 2 vessel CAD no intervention  Echo 2/6/20: normal LV size and systolic function. Estimated LVEF 55%, RV size and systolic function, normal biatrial size, normal aortic valvular function, trace MR, trace TR, normal pulmonic valve      Post WILLI to the Right external iliac artery   	  All Ros negative unless otherwise indicated below:    MEDICATIONS  (STANDING):  aspirin enteric coated 81 milliGRAM(s) Oral daily  atorvastatin 40 milliGRAM(s) Oral at bedtime  cilostazol 50 milliGRAM(s) Oral two times a day  clopidogrel Tablet 75 milliGRAM(s) Oral daily  isosorbide   mononitrate ER Tablet (IMDUR) 30 milliGRAM(s) Oral daily  metoprolol tartrate 25 milliGRAM(s) Oral two times a day  pantoprazole    Tablet 40 milliGRAM(s) Oral before breakfast    MEDICATIONS  (PRN):      PHYSICAL EXAM:    T(C): 36.7 (10-09-20 @ 09:20), Max: 36.7 (10-09-20 @ 09:20)  HR: 104 (10-09-20 @ 09:20) (90 - 106)  BP: 115/63 (10-09-20 @ 09:20) (110/74 - 148/76)  RR: 16 (10-09-20 @ 09:20) (15 - 18)  SpO2: 98% (10-09-20 @ 09:20) (94% - 99%)  Wt(kg): --    Appearance: Normal	  HEENT:   Normal oral mucosa,   Lymphatic: No lymphadenopathy  Cardiovascular: Normal S1 S2, No JVD, No murmurs, No edema  Respiratory: Lungs clear to auscultation,	Unlabored  Gastrointestinal:  Soft, Non-tender, + BS	 no Pain  Skin: warm and dry  Neurologic: Non-focal Unchanged from baseline  Extremities: Normal range of motion,:  Right groin: Minimal ecchymosis no hematoma   Vascular: Peripheral pulses palpable 2+ LLE,  brisk cap refill    TELEMETRY: 	No events afib      LABS:	 	                              14.9   9.20  )-----------( 215      ( 09 Oct 2020 06:01 )             45.2     10-09    135  |  102  |  10.0  ----------------------------<  107<H>  3.9   |  23.0  |  0.73    Ca    8.4<L>      09 Oct 2020 06:01  Mg     1.9     10-08    TPro  6.5<L>  /  Alb  3.3  /  TBili  0.7  /  DBili  x   /  AST  15  /  ALT  13  /  AlkPhos  83  10-09    PT/INR - ( 08 Oct 2020 08:38 )   PT: 19.0 sec;   INR: 1.68 ratio         PTT - ( 08 Oct 2020 08:38 )  PTT:38.1 sec            ASSESSMENT:  HPI:  Narrative: 71 y/o M, former smoker, with PMHx of CAD (dLAD, prox and mid Cx diffuse disease and RCA ), Atrial fibrillation (on Coumadin), HTN, HLD now with progressive claudication which has now become lifestyle limiting; he presents today in anticipation for a Aortobifemoral angiography iliac femoropopliteal angio and possible stent placement with Dr. Jean.  pt present today with c/o severe claudication / pain Class II B  with  minimal exertion and lifestyle limiting     Patient has complained of right and left lower extremity weakness, pain and inability to walk which has been progressively worse. Per patient, symptoms started on the right lower extremity and has progressed to the left lower extremity with associated limiting symptoms. Patient is not able to walk long distances and currently has not been able to walk in the past few days. Last documented ischemic evaluation (in [place]) 4/2011 was significant for normal NST and Normal LV systolic function on echocardiogram EF 60%. Per review of outpatient clinical documentation, pt had US LE which showed bilateral moderate to severe SFA stenosis.    Cardiac Cath 9/17/20: RRA access (no sit complications; with Dr. Stein), coronary circulation is right dominant, LM normal, LAD normal, mCX 80%, ostial %; severe 2 vessel CAD no intervention  Echo 2/6/20: normal LV size and systolic function. Estimated LVEF 55%, RV size and systolic function, normal biatrial size, normal aortic valvular function, trace MR, trace TR, normal pulmonic valve    Post Right external iliac stents X 2       Plan:  Plan to follow up with Dr Mercer for Left fempop Bipass   Post PCI teaching reviewed with patient.   Importance of Antiplatlet therapy reinforced   Groin care instructions explained and reinforced.    Home and new medications reviewed and enforced with patient.   Importance of follow up care after hospitalization enforced.   Stop Pletal  continue coumadin with INR check in 3 days   Follow up 3 days for INR  Cardiac rehab info provided and referral for cardiac rehab completed as well as communication to patient, cardiology attending and facility.

## 2020-10-09 NOTE — DISCHARGE NOTE NURSING/CASE MANAGEMENT/SOCIAL WORK - PATIENT PORTAL LINK FT
You can access the FollowMyHealth Patient Portal offered by [facility] by registering at the following website: [email or web address]. By joining [employer]’s FollowMyHealth portal, you will also be able to view your health information using other applications (apps) compatible with our system.

## 2020-10-12 PROBLEM — I73.9 PERIPHERAL VASCULAR DISEASE, UNSPECIFIED: Chronic | Status: ACTIVE | Noted: 2020-10-07

## 2020-10-15 ENCOUNTER — APPOINTMENT (OUTPATIENT)
Dept: VASCULAR SURGERY | Facility: CLINIC | Age: 70
End: 2020-10-15
Payer: MEDICAID

## 2020-10-15 VITALS
SYSTOLIC BLOOD PRESSURE: 112 MMHG | TEMPERATURE: 98.4 F | BODY MASS INDEX: 31.58 KG/M2 | RESPIRATION RATE: 16 BRPM | DIASTOLIC BLOOD PRESSURE: 67 MMHG | WEIGHT: 185 LBS | HEIGHT: 64 IN | HEART RATE: 114 BPM

## 2020-10-15 PROCEDURE — 93923 UPR/LXTR ART STDY 3+ LVLS: CPT

## 2020-10-15 PROCEDURE — 99203 OFFICE O/P NEW LOW 30 MIN: CPT

## 2020-10-19 NOTE — PHYSICAL EXAM
[Normal Breath Sounds] : Normal breath sounds [Normal Heart Sounds] : normal heart sounds [2+] : right 2+ [0] : left 0 [No Rash or Lesion] : No rash or lesion [Oriented to Person] : oriented to person [Alert] : alert [Oriented to Place] : oriented to place [Oriented to Time] : oriented to time [JVD] : no jugular venous distention  [Varicose Veins Of Lower Extremities] : not present [Ankle Swelling (On Exam)] : not present [Abdomen Masses] : No abdominal masses [] : not present [de-identified] : Well appearing [Abdomen Tenderness] : ~T ~M No abdominal tenderness [de-identified] : NCAT, PERRL [FreeTextEntry1] : Both feet warm, well perfused

## 2020-10-19 NOTE — HISTORY OF PRESENT ILLNESS
[FreeTextEntry1] : 70 year old male with CAD and severe bilateral LE PAD presents for evaluation for left leg claudication. He previous heavy smoker but quit following recent right leg percutaneous revascularization.\par Patient has a long standing history of PAD and bilateral thigh claudication but has recently progressed to become lifestyle limiting. He is unable to walk > 10 steps before having to stop due to thigh pain.\par He denies rest pain or tissue loss.\par He recently underwent recent aortobiiliac angiogram by Dr Jean 1 week ago that revealed focal stenoses of the right common iliac and external iliac arteries that were successfully treated with covered stents. He was also noted to have flush occlusion of the left common iliac with reconstitution of his left CFA. His left SFA and profunda are patent.\par Since intervention, his right leg claudication has completely resolved but he continues to have lifestyle limiting symptoms in his left leg.\par He denies chest pain, effort intolerance, MUÑOZ or orthopnea

## 2020-10-19 NOTE — ASSESSMENT
[FreeTextEntry1] : 70 year old male with lifestyle limiting left leg claudication secondary to left common iliac and external iliac occlusion. His right leg claudication has resolved following recent right GIFTY and EIA stenting.\par Left leg resting BENJAMIN is 0.68. Due to significant pain, he does not wish to perform an exercise BENJAMIN today.\par -I have explained to him that while he is significantly symptomatic, his ischemia is unlikely limb threatening and his walking distance may improve with exercise and continued smoking cessation. Patient however states that his pain is so debilitating that he will be unable to perform any meaningful amount of exercise. He is depressed about being unable to complete his ADLs without disabling left thigh pain.\par -In light of severity of his symptoms, I have offered him a right iliac angiogram and possible iliac stent. If this is unsuccessful, he may require a femoral-femoral bypass. We have discussed the risks of the procedure including perioperative MI, bleeding, infection and bypass occlusion. I have answered his questions and he will like to proceed\par -Recommend continued smoking cessation\par -Will obtain preoperative cardiac risk assessment\par

## 2020-10-29 PROCEDURE — 99153 MOD SED SAME PHYS/QHP EA: CPT

## 2020-10-29 PROCEDURE — 36415 COLL VENOUS BLD VENIPUNCTURE: CPT

## 2020-10-29 PROCEDURE — 37221: CPT

## 2020-10-29 PROCEDURE — 86850 RBC ANTIBODY SCREEN: CPT

## 2020-10-29 PROCEDURE — 85027 COMPLETE CBC AUTOMATED: CPT

## 2020-10-29 PROCEDURE — 80053 COMPREHEN METABOLIC PANEL: CPT

## 2020-10-29 PROCEDURE — C1725: CPT

## 2020-10-29 PROCEDURE — C1894: CPT

## 2020-10-29 PROCEDURE — 86901 BLOOD TYPING SEROLOGIC RH(D): CPT

## 2020-10-29 PROCEDURE — 85610 PROTHROMBIN TIME: CPT

## 2020-10-29 PROCEDURE — 37223: CPT

## 2020-10-29 PROCEDURE — 83735 ASSAY OF MAGNESIUM: CPT

## 2020-10-29 PROCEDURE — 99152 MOD SED SAME PHYS/QHP 5/>YRS: CPT

## 2020-10-29 PROCEDURE — C1769: CPT

## 2020-10-29 PROCEDURE — 86900 BLOOD TYPING SEROLOGIC ABO: CPT

## 2020-10-29 PROCEDURE — 85730 THROMBOPLASTIN TIME PARTIAL: CPT

## 2020-10-29 PROCEDURE — 85025 COMPLETE CBC W/AUTO DIFF WBC: CPT

## 2020-10-29 PROCEDURE — C1887: CPT

## 2020-10-29 PROCEDURE — 93005 ELECTROCARDIOGRAM TRACING: CPT

## 2020-10-29 PROCEDURE — C1874: CPT

## 2020-10-29 PROCEDURE — 75716 ARTERY X-RAYS ARMS/LEGS: CPT | Mod: XU

## 2020-10-29 PROCEDURE — 80048 BASIC METABOLIC PNL TOTAL CA: CPT

## 2020-11-07 ENCOUNTER — APPOINTMENT (OUTPATIENT)
Dept: DISASTER EMERGENCY | Facility: CLINIC | Age: 70
End: 2020-11-07

## 2020-11-08 LAB — SARS-COV-2 N GENE NPH QL NAA+PROBE: NOT DETECTED

## 2020-11-10 ENCOUNTER — TRANSCRIPTION ENCOUNTER (OUTPATIENT)
Age: 70
End: 2020-11-10

## 2020-11-10 ENCOUNTER — INPATIENT (INPATIENT)
Facility: HOSPITAL | Age: 70
LOS: 0 days | Discharge: HOME | DRG: 254 | End: 2020-11-11
Attending: SURGERY | Admitting: SURGERY
Payer: MEDICARE

## 2020-11-10 VITALS
SYSTOLIC BLOOD PRESSURE: 116 MMHG | TEMPERATURE: 98 F | RESPIRATION RATE: 18 BRPM | DIASTOLIC BLOOD PRESSURE: 75 MMHG | OXYGEN SATURATION: 96 % | WEIGHT: 184.97 LBS | HEART RATE: 109 BPM | HEIGHT: 64 IN

## 2020-11-10 DIAGNOSIS — Z95.820 PERIPHERAL VASCULAR ANGIOPLASTY STATUS WITH IMPLANTS AND GRAFTS: Chronic | ICD-10-CM

## 2020-11-10 DIAGNOSIS — I70.213 ATHEROSCLEROSIS OF NATIVE ARTERIES OF EXTREMITIES WITH INTERMITTENT CLAUDICATION, BILATERAL LEGS: ICD-10-CM

## 2020-11-10 LAB
ANION GAP SERPL CALC-SCNC: 11 MMOL/L — SIGNIFICANT CHANGE UP (ref 5–17)
APTT BLD: 34.9 SEC — SIGNIFICANT CHANGE UP (ref 27.5–35.5)
BASOPHILS # BLD AUTO: 0.07 K/UL — SIGNIFICANT CHANGE UP (ref 0–0.2)
BASOPHILS NFR BLD AUTO: 0.8 % — SIGNIFICANT CHANGE UP (ref 0–2)
BLD GP AB SCN SERPL QL: SIGNIFICANT CHANGE UP
BUN SERPL-MCNC: 13 MG/DL — SIGNIFICANT CHANGE UP (ref 8–20)
CALCIUM SERPL-MCNC: 9.2 MG/DL — SIGNIFICANT CHANGE UP (ref 8.6–10.2)
CHLORIDE SERPL-SCNC: 102 MMOL/L — SIGNIFICANT CHANGE UP (ref 98–107)
CO2 SERPL-SCNC: 24 MMOL/L — SIGNIFICANT CHANGE UP (ref 22–29)
CREAT SERPL-MCNC: 0.73 MG/DL — SIGNIFICANT CHANGE UP (ref 0.5–1.3)
EOSINOPHIL # BLD AUTO: 0.65 K/UL — HIGH (ref 0–0.5)
EOSINOPHIL NFR BLD AUTO: 7.6 % — HIGH (ref 0–6)
GLUCOSE SERPL-MCNC: 119 MG/DL — HIGH (ref 70–99)
HCT VFR BLD CALC: 49.3 % — SIGNIFICANT CHANGE UP (ref 39–50)
HGB BLD-MCNC: 15.9 G/DL — SIGNIFICANT CHANGE UP (ref 13–17)
IMM GRANULOCYTES NFR BLD AUTO: 0.1 % — SIGNIFICANT CHANGE UP (ref 0–1.5)
INR BLD: 1.48 RATIO — HIGH (ref 0.88–1.16)
LYMPHOCYTES # BLD AUTO: 3.04 K/UL — SIGNIFICANT CHANGE UP (ref 1–3.3)
LYMPHOCYTES # BLD AUTO: 35.7 % — SIGNIFICANT CHANGE UP (ref 13–44)
MCHC RBC-ENTMCNC: 28.9 PG — SIGNIFICANT CHANGE UP (ref 27–34)
MCHC RBC-ENTMCNC: 32.3 GM/DL — SIGNIFICANT CHANGE UP (ref 32–36)
MCV RBC AUTO: 89.5 FL — SIGNIFICANT CHANGE UP (ref 80–100)
MONOCYTES # BLD AUTO: 0.88 K/UL — SIGNIFICANT CHANGE UP (ref 0–0.9)
MONOCYTES NFR BLD AUTO: 10.3 % — SIGNIFICANT CHANGE UP (ref 2–14)
NEUTROPHILS # BLD AUTO: 3.86 K/UL — SIGNIFICANT CHANGE UP (ref 1.8–7.4)
NEUTROPHILS NFR BLD AUTO: 45.5 % — SIGNIFICANT CHANGE UP (ref 43–77)
PLATELET # BLD AUTO: 209 K/UL — SIGNIFICANT CHANGE UP (ref 150–400)
POTASSIUM SERPL-MCNC: 4.3 MMOL/L — SIGNIFICANT CHANGE UP (ref 3.5–5.3)
POTASSIUM SERPL-SCNC: 4.3 MMOL/L — SIGNIFICANT CHANGE UP (ref 3.5–5.3)
PROTHROM AB SERPL-ACNC: 16.8 SEC — HIGH (ref 10.6–13.6)
RBC # BLD: 5.51 M/UL — SIGNIFICANT CHANGE UP (ref 4.2–5.8)
RBC # FLD: 13.2 % — SIGNIFICANT CHANGE UP (ref 10.3–14.5)
SODIUM SERPL-SCNC: 137 MMOL/L — SIGNIFICANT CHANGE UP (ref 135–145)
WBC # BLD: 8.51 K/UL — SIGNIFICANT CHANGE UP (ref 3.8–10.5)
WBC # FLD AUTO: 8.51 K/UL — SIGNIFICANT CHANGE UP (ref 3.8–10.5)

## 2020-11-10 PROCEDURE — 37221: CPT

## 2020-11-10 PROCEDURE — 37222: CPT

## 2020-11-10 PROCEDURE — 76937 US GUIDE VASCULAR ACCESS: CPT | Mod: 26,59

## 2020-11-10 PROCEDURE — 75630 X-RAY AORTA LEG ARTERIES: CPT | Mod: 26,59

## 2020-11-10 PROCEDURE — 93880 EXTRACRANIAL BILAT STUDY: CPT | Mod: 26

## 2020-11-10 RX ORDER — ISOSORBIDE MONONITRATE 60 MG/1
1 TABLET, EXTENDED RELEASE ORAL
Qty: 0 | Refills: 0 | DISCHARGE

## 2020-11-10 RX ORDER — SIMVASTATIN 20 MG/1
1 TABLET, FILM COATED ORAL
Qty: 0 | Refills: 0 | DISCHARGE

## 2020-11-10 RX ORDER — ASPIRIN/CALCIUM CARB/MAGNESIUM 324 MG
1 TABLET ORAL
Qty: 0 | Refills: 0 | DISCHARGE

## 2020-11-10 RX ORDER — ISOSORBIDE MONONITRATE 60 MG/1
30 TABLET, EXTENDED RELEASE ORAL DAILY
Refills: 0 | Status: DISCONTINUED | OUTPATIENT
Start: 2020-11-11 | End: 2020-11-11

## 2020-11-10 RX ORDER — CLOPIDOGREL BISULFATE 75 MG/1
1 TABLET, FILM COATED ORAL
Qty: 0 | Refills: 0 | DISCHARGE

## 2020-11-10 RX ORDER — PANTOPRAZOLE SODIUM 20 MG/1
1 TABLET, DELAYED RELEASE ORAL
Qty: 0 | Refills: 0 | DISCHARGE

## 2020-11-10 RX ORDER — CLOPIDOGREL BISULFATE 75 MG/1
75 TABLET, FILM COATED ORAL DAILY
Refills: 0 | Status: DISCONTINUED | OUTPATIENT
Start: 2020-11-11 | End: 2020-11-11

## 2020-11-10 RX ORDER — SIMVASTATIN 20 MG/1
40 TABLET, FILM COATED ORAL AT BEDTIME
Refills: 0 | Status: DISCONTINUED | OUTPATIENT
Start: 2020-11-10 | End: 2020-11-11

## 2020-11-10 RX ORDER — ASPIRIN/CALCIUM CARB/MAGNESIUM 324 MG
81 TABLET ORAL DAILY
Refills: 0 | Status: DISCONTINUED | OUTPATIENT
Start: 2020-11-11 | End: 2020-11-11

## 2020-11-10 RX ORDER — ACETAMINOPHEN 500 MG
650 TABLET ORAL EVERY 6 HOURS
Refills: 0 | Status: DISCONTINUED | OUTPATIENT
Start: 2020-11-10 | End: 2020-11-11

## 2020-11-10 RX ORDER — WARFARIN SODIUM 2.5 MG/1
1 TABLET ORAL
Qty: 0 | Refills: 0 | DISCHARGE

## 2020-11-10 RX ORDER — OXYCODONE AND ACETAMINOPHEN 5; 325 MG/1; MG/1
1 TABLET ORAL EVERY 6 HOURS
Refills: 0 | Status: DISCONTINUED | OUTPATIENT
Start: 2020-11-10 | End: 2020-11-11

## 2020-11-10 RX ORDER — ZOLPIDEM TARTRATE 10 MG/1
5 TABLET ORAL AT BEDTIME
Refills: 0 | Status: DISCONTINUED | OUTPATIENT
Start: 2020-11-10 | End: 2020-11-11

## 2020-11-10 RX ORDER — PANTOPRAZOLE SODIUM 20 MG/1
40 TABLET, DELAYED RELEASE ORAL
Refills: 0 | Status: DISCONTINUED | OUTPATIENT
Start: 2020-11-10 | End: 2020-11-11

## 2020-11-10 RX ORDER — METOPROLOL TARTRATE 50 MG
12.5 TABLET ORAL
Refills: 0 | Status: DISCONTINUED | OUTPATIENT
Start: 2020-11-10 | End: 2020-11-11

## 2020-11-10 RX ORDER — CHLORHEXIDINE GLUCONATE 213 G/1000ML
1 SOLUTION TOPICAL ONCE
Refills: 0 | Status: DISCONTINUED | OUTPATIENT
Start: 2020-11-10 | End: 2020-11-11

## 2020-11-10 RX ORDER — WARFARIN SODIUM 2.5 MG/1
4 TABLET ORAL ONCE
Refills: 0 | Status: COMPLETED | OUTPATIENT
Start: 2020-11-10 | End: 2020-11-10

## 2020-11-10 RX ORDER — METOPROLOL TARTRATE 50 MG
0.5 TABLET ORAL
Qty: 0 | Refills: 0 | DISCHARGE

## 2020-11-10 RX ADMIN — SIMVASTATIN 40 MILLIGRAM(S): 20 TABLET, FILM COATED ORAL at 21:10

## 2020-11-10 RX ADMIN — WARFARIN SODIUM 4 MILLIGRAM(S): 2.5 TABLET ORAL at 21:10

## 2020-11-10 RX ADMIN — Medication 12.5 MILLIGRAM(S): at 16:45

## 2020-11-10 RX ADMIN — PANTOPRAZOLE SODIUM 40 MILLIGRAM(S): 20 TABLET, DELAYED RELEASE ORAL at 16:45

## 2020-11-10 RX ADMIN — OXYCODONE AND ACETAMINOPHEN 1 TABLET(S): 5; 325 TABLET ORAL at 15:51

## 2020-11-10 RX ADMIN — OXYCODONE AND ACETAMINOPHEN 1 TABLET(S): 5; 325 TABLET ORAL at 16:17

## 2020-11-10 NOTE — DISCHARGE NOTE PROVIDER - NSDCFUADDAPPT_GEN_ALL_CORE_FT
Follow up with Dr. Mercer in one to two weeks    No heavy lifting, driving, sex, tub baths, swimming, or any activity that submerges the lower half of the body in water for 48 hours.  Limited walking and stairs for 48 hours.    Change the bandaid after 24 hours and every 24 hours after that.  Keep the puncture site dry and covered with a bandaid until a scab forms.    Observe the site frequently.  If bleeding or a large lump (the size of a golf ball or bigger) occurs lie flat, apply continuous direct pressure just above the puncture site for at least 10 minutes, and notify your physician immediately.  If the bleeding cannot be controlled, call 911 immediately for assistance.  Notify your physician of pain, swelling or any drainage.    Notify your physician immediately if coldness, numbness, discoloration or pain in your foot occurs.   Follow up with Dr. Mercer in one week    No heavy lifting, driving, sex, tub baths, swimming, or any activity that submerges the lower half of the body in water for 48 hours.  Limited walking and stairs for 48 hours.    Change the bandaid after 24 hours and every 24 hours after that.  Keep the puncture site dry and covered with a bandaid until a scab forms.    Observe the site frequently.  If bleeding or a large lump (the size of a golf ball or bigger) occurs lie flat, apply continuous direct pressure just above the puncture site for at least 10 minutes, and notify your physician immediately.  If the bleeding cannot be controlled, call 911 immediately for assistance.  Notify your physician of pain, swelling or any drainage.    Notify your physician immediately if coldness, numbness, discoloration or pain in your foot occurs.

## 2020-11-10 NOTE — H&P PST ADULT - NSANTHOSAYNRD_GEN_A_CORE
No. MARVA screening performed.  STOP BANG Legend: 0-2 = LOW Risk; 3-4 = INTERMEDIATE Risk; 5-8 = HIGH Risk

## 2020-11-10 NOTE — DISCHARGE NOTE PROVIDER - CARE PROVIDERS DIRECT ADDRESSES
[email or web address]

## 2020-11-10 NOTE — H&P PST ADULT - NSICDXPASTSURGICALHX_GEN_ALL_CORE_FT
PAST SURGICAL HISTORY:  S/P peripheral artery angioplasty with stent placement s/p PTA and stenting of right GIFTY and right EIA 10/8/20

## 2020-11-10 NOTE — H&P PST ADULT - HISTORY OF PRESENT ILLNESS
This is a 69 y/o male with CAD and severe bilateral LE PAD with c/o left leg claudication.  He was a previous heavy smoker but quit following recent Right leg percutaneous revascularization in October with Dr. Jean.  He has a longstanding h/o PAD and bilateral thigh claudication that has recently progressed to become lifestyle limiting.  He is unable to walk > 10 steps before having to stop due to thigh pain.  He denies rest pain or tissue loss.  He recently underwent aortobilliac angiogram with Dr. Jean that revealed focal stenoses of the Right common iliac and external iliac arteries that were successfully treated with covered stents.  He was also noted to have flush occlusion of the left common iliac with reconstitution of his left CFA.  His left SFA and profunda are patent.  Since intervention, his right leg claudication has completely resolved but he continues to have lifestyle limiting symptoms in his left leg.  He denies chest pain, effort intolerance, MUÑOZ or orthopnea.  Left leg resting BENJAMIN was 0.68.  Due to significant pain, he refused to perform an exercise BENJAMIN at time of evaluation.  Because his pain is so debilitating he states he will be unable to perform any meaningful amount of exercise to try to improve his walking distance.  He presents today for an elective right iliac angiogram and possible iliac stent with Dr. Mercer.    Lower Arterial Exam:   RLE BENJAMIN 0.99  LLE BENJAMIN .70

## 2020-11-10 NOTE — DISCHARGE NOTE PROVIDER - NSDCCPCAREPLAN_GEN_ALL_CORE_FT
PRINCIPAL DISCHARGE DIAGNOSIS  Diagnosis: PAD (peripheral artery disease)  Assessment and Plan of Treatment: Continue antiplatelet therapy       PRINCIPAL DISCHARGE DIAGNOSIS  Diagnosis: PAD (peripheral artery disease)  Assessment and Plan of Treatment: Continue antiplatelet therapy      SECONDARY DISCHARGE DIAGNOSES  Diagnosis: GERD (gastroesophageal reflux disease)  Assessment and Plan of Treatment:     Diagnosis: CAD (coronary artery disease)  Assessment and Plan of Treatment:     Diagnosis: HLD (hyperlipidemia)  Assessment and Plan of Treatment:     Diagnosis: HTN (hypertension)  Assessment and Plan of Treatment:

## 2020-11-10 NOTE — DISCHARGE NOTE PROVIDER - PROVIDER TOKENS
PROVIDER:[TOKEN:[[id number]]] PROVIDER:[TOKEN:[[id number]],FOLLOWUP:[1 week]],PROVIDER:[TOKEN:[[id number]],FOLLOWUP:[2 weeks]]

## 2020-11-10 NOTE — DISCHARGE NOTE PROVIDER - NSDCACTIVITY_GEN_ALL_CORE
Walking - Outdoors allowed/Showering allowed/No heavy lifting/straining/Walking - Indoors allowed/Stairs allowed

## 2020-11-10 NOTE — PROGRESS NOTE ADULT - SUBJECTIVE AND OBJECTIVE BOX
Cardiology NP Post procedure note:     -s/p right external iliac artery stenting x 1 and left common iliac artery stenting x 1 via bilateral femoral artery access both closed via MYNX closure devices by Dr. Mercer  LIFESTREAM 9mm x 38mm x 80cm  LIFESTREAM 8mm x 37mm x 80cm    TELE: afib 70-80s    MEDICATIONS  (STANDING):  chlorhexidine 4% Liquid 1 Application(s) Topical once  metoprolol tartrate 12.5 milliGRAM(s) Oral two times a day  pantoprazole    Tablet 40 milliGRAM(s) Oral before breakfast  simvastatin 40 milliGRAM(s) Oral at bedtime  warfarin 4 milliGRAM(s) Oral once    MEDICATIONS  (PRN):  acetaminophen   Tablet .. 650 milliGRAM(s) Oral every 6 hours PRN Mild Pain (1 - 3)  zolpidem 5 milliGRAM(s) Oral at bedtime PRN Insomnia      Allergies:  eggs (Stomach Upset)  No Known Drug Allergies      PAST MEDICAL & SURGICAL HISTORY:  PVD (peripheral vascular disease)    HLD (hyperlipidemia)    HTN (hypertension)    CAD (coronary artery disease)    Atrial fibrillation    S/P peripheral artery angioplasty with stent placement  s/p PTA and stenting of right GIFTY and right EIA 10/8/20        Vital Signs Last 24 Hrs  T(C): 36.5 (10 Nov 2020 08:00), Max: 36.5 (10 Nov 2020 08:00)  T(F): 97.7 (10 Nov 2020 08:00), Max: 97.7 (10 Nov 2020 08:00)  HR: 100 (10 Nov 2020 13:30) (93 - 109)  BP: 131/65 (10 Nov 2020 13:30) (116/72 - 154/84)  BP(mean): --  RR: 18 (10 Nov 2020 13:30) (18 - 18)  SpO2: 96% (10 Nov 2020 13:30) (93% - 96%)    Physical Exam:  Constitutional: NAD, AAOx3  Cardiovascular: +S1S2 RRR  Pulmonary: CTA b/l, unlabored  GI: soft NTND +BS  Extremities: no pedal edema, +distal pulses b/l  Neuro: non focal, YANG x4  Procedure sites: Bilateral femoral arterial Mynx sites benign without hematoma/bleeding; no bruit bilateral; +2 RPP and +1 LPP    LABS:                        15.9   8.51  )-----------( 209      ( 10 Nov 2020 08:17 )             49.3     11-10    137  |  102  |  13.0  ----------------------------<  119<H>  4.3   |  24.0  |  0.73    Ca    9.2      10 Nov 2020 08:17      PT/INR - ( 10 Nov 2020 08:17 )   PT: 16.8 sec;   INR: 1.48 ratio         PTT - ( 10 Nov 2020 08:17 )  PTT:34.9 sec      RADIOLOGY & ADDITIONAL TESTS:

## 2020-11-10 NOTE — DISCHARGE NOTE PROVIDER - CARE PROVIDER_API CALL
Jeevan Mercer  SURGERY  [address], [address] [place] [address]  Phone: [phone]  Fax: [phone]  Follow Up Time:    Jeevan Mercer  SURGERY  [address], [address] [place] [address]  Phone: [phone]  Fax: [phone]  Follow Up Time: 1 week    Prashant Jean  CARDIOVASCULAR DISEASE  [address], [address]  [place], [place] [id number]  Phone: [phone]  Fax: [phone]  Follow Up Time: 2 weeks

## 2020-11-10 NOTE — H&P PST ADULT - ASSESSMENT
ADL limiting claudication and PAD for elective right iliac angiogram and possible iliac stent with Dr. Mercer.    -NPO for procedure

## 2020-11-10 NOTE — DISCHARGE NOTE PROVIDER - NSDCMRMEDTOKEN_GEN_ALL_CORE_FT
aspirin 81 mg oral delayed release tablet: 1 tab(s) orally once a day  clopidogrel 75 mg oral tablet: 1 tab(s) orally once a day  isosorbide mononitrate 30 mg oral tablet, extended release: 1 tab(s) orally once a day (in the morning)  metoprolol tartrate 25 mg oral tablet: 0.5 tab(s) orally 2 times a day  pantoprazole 40 mg oral delayed release tablet: 1 tab(s) orally once a day  simvastatin 40 mg oral tablet: 1 tab(s) orally once a day (at bedtime)  warfarin 4 mg oral tablet: 1 tab(s) orally once a day   acetaminophen 325 mg oral tablet: 2 tab(s) orally every 6 hours, As needed, Mild Pain (1 - 3)  aspirin 81 mg oral delayed release tablet: 1 tab(s) orally once a day  clopidogrel 75 mg oral tablet: 1 tab(s) orally once a day  isosorbide mononitrate 30 mg oral tablet, extended release: 1 tab(s) orally once a day (in the morning)  metoprolol tartrate 25 mg oral tablet: 0.5 tab(s) orally 2 times a day  pantoprazole 40 mg oral delayed release tablet: 1 tab(s) orally once a day  simvastatin 40 mg oral tablet: 1 tab(s) orally once a day (at bedtime)  warfarin 4 mg oral tablet: 1 tab(s) orally once a day

## 2020-11-10 NOTE — DISCHARGE NOTE PROVIDER - NSDCCPTREATMENT_GEN_ALL_CORE_FT
PRINCIPAL PROCEDURE  Procedure: Insertion of drug-eluting peripheral vessel stent(s)  Findings and Treatment:

## 2020-11-10 NOTE — H&P PST ADULT - ATTENDING COMMENTS
Patient with lifestyle limiting left leg claudication secondary to left iliac stenosis  plan is for bilateral iliac angioplasty and stent.   Risks and benefits discussed with patient. All questions answered

## 2020-11-10 NOTE — PROGRESS NOTE ADULT - ASSESSMENT
A/P: This is a 71 y/o male with CAD and severe bilateral LE PAD with c/o left leg claudication.  He was a previous heavy smoker but quit following recent Right leg percutaneous revascularization in October with Dr. Jean.  He has a longstanding h/o PAD and bilateral thigh claudication that has recently progressed to become lifestyle limiting.  He is unable to walk > 10 steps before having to stop due to thigh pain.  He denies rest pain or tissue loss.  He recently underwent aortobilliac angiogram with Dr. Jean that revealed focal stenoses of the Right common iliac and external iliac arteries that were successfully treated with covered stents.  He was also noted to have flush occlusion of the left common iliac with reconstitution of his left CFA.  His left SFA and profunda are patent.  Since intervention, his right leg claudication has completely resolved but he continues to have lifestyle limiting symptoms in his left leg.  He denies chest pain, effort intolerance, MUÑOZ or orthopnea.  Left leg resting BENJAMIN was 0.68.  Due to significant pain, he refused to perform an exercise BENJAMIN at time of evaluation.  Because his pain is so debilitating he states he will be unable to perform any meaningful amount of exercise to try to improve his walking distance.  He presents today for an elective right iliac angiogram and possible iliac stent with Dr. Mercer.  Now s/p right external iliac artery stenting x 1 and left common iliac artery stenting x 1 via bilateral femoral artery access both closed via MYNX closure devices by Dr. Mercer.  -Admit to tele--Dr. Mercer's service  -Plan for discharge to home in the am (to be discharged by Vascular surgery team)  -Bedrest x 6 hours post procedure with HOB at 30 degrees then OOB as tolerated  -Meds: Maintain ASA/Plavix/statin and all other meds including coumadin  -Restart coumadin tonight  -Follow up with Dr. Mercer in one to two weeks as an outpatient  -Carotid doppler tonight for further PAD evaluation as per Dr. Mercer  -Activity instructions discussed with patient verbal understanding  -Benefits of ASA/Plavix emphasized with patient verbal understanding  -Discussed with Dr. Mercer

## 2020-11-10 NOTE — DISCHARGE NOTE PROVIDER - HOSPITAL COURSE
This is a 71 y/o male with CAD and severe bilateral LE PAD with c/o left leg claudication.  He was a previous heavy smoker but quit following recent Right leg percutaneous revascularization in October with Dr. Jean.  He has a longstanding h/o PAD and bilateral thigh claudication that has recently progressed to become lifestyle limiting.  He is unable to walk > 10 steps before having to stop due to thigh pain.  He denies rest pain or tissue loss.  He recently underwent aortobilliac angiogram with Dr. Jean that revealed focal stenoses of the Right common iliac and external iliac arteries that were successfully treated with covered stents.  He was also noted to have flush occlusion of the left common iliac with reconstitution of his left CFA.  His left SFA and profunda are patent.  Since intervention, his right leg claudication has completely resolved but he continues to have lifestyle limiting symptoms in his left leg.  He denies chest pain, effort intolerance, MUÑOZ or orthopnea.  Left leg resting BENJAMIN was 0.68.  Due to significant pain, he refused to perform an exercise BENJAMIN at time of evaluation.  Because his pain is so debilitating he states he will be unable to perform any meaningful amount of exercise to try to improve his walking distance.    Lower Arterial Exam:   RLE BENJAMIN 0.99  LLE BENJAMIN .70 (10 Nov 2020 08:28)  Pt taken to cath lab on 11/10/20 by Dr Mercer and underwent aortogram with R external iliac angioplasty and stent and Left common iliac artery angioplasty and stent. Bilat access sites succussfully deployed Mynx. The pt tolerated the procedure well. Due to the need for bilat groin access, the pt was monitored overnight without complications. His Coumadin was resumed. As part of surveillance, he also underwent a carotid duplex which revealed no hemodynamically significant stenosis. He was tolerating his diet and ambulating independently. He is stable for discharge

## 2020-11-11 ENCOUNTER — TRANSCRIPTION ENCOUNTER (OUTPATIENT)
Age: 70
End: 2020-11-11

## 2020-11-11 VITALS
OXYGEN SATURATION: 98 % | TEMPERATURE: 98 F | DIASTOLIC BLOOD PRESSURE: 69 MMHG | HEART RATE: 99 BPM | RESPIRATION RATE: 13 BRPM | SYSTOLIC BLOOD PRESSURE: 132 MMHG

## 2020-11-11 LAB
ANION GAP SERPL CALC-SCNC: 10 MMOL/L — SIGNIFICANT CHANGE UP (ref 5–17)
APTT BLD: 38.1 SEC — HIGH (ref 27.5–35.5)
BASOPHILS # BLD AUTO: 0.07 K/UL — SIGNIFICANT CHANGE UP (ref 0–0.2)
BASOPHILS NFR BLD AUTO: 0.7 % — SIGNIFICANT CHANGE UP (ref 0–2)
BUN SERPL-MCNC: 12 MG/DL — SIGNIFICANT CHANGE UP (ref 8–20)
CALCIUM SERPL-MCNC: 9.2 MG/DL — SIGNIFICANT CHANGE UP (ref 8.6–10.2)
CHLORIDE SERPL-SCNC: 103 MMOL/L — SIGNIFICANT CHANGE UP (ref 98–107)
CO2 SERPL-SCNC: 25 MMOL/L — SIGNIFICANT CHANGE UP (ref 22–29)
CREAT SERPL-MCNC: 0.68 MG/DL — SIGNIFICANT CHANGE UP (ref 0.5–1.3)
EOSINOPHIL # BLD AUTO: 0.53 K/UL — HIGH (ref 0–0.5)
EOSINOPHIL NFR BLD AUTO: 5.3 % — SIGNIFICANT CHANGE UP (ref 0–6)
GLUCOSE SERPL-MCNC: 100 MG/DL — HIGH (ref 70–99)
HCT VFR BLD CALC: 49.5 % — SIGNIFICANT CHANGE UP (ref 39–50)
HGB BLD-MCNC: 16.4 G/DL — SIGNIFICANT CHANGE UP (ref 13–17)
IMM GRANULOCYTES NFR BLD AUTO: 0.2 % — SIGNIFICANT CHANGE UP (ref 0–1.5)
INR BLD: 1.23 RATIO — HIGH (ref 0.88–1.16)
LYMPHOCYTES # BLD AUTO: 2.26 K/UL — SIGNIFICANT CHANGE UP (ref 1–3.3)
LYMPHOCYTES # BLD AUTO: 22.4 % — SIGNIFICANT CHANGE UP (ref 13–44)
MCHC RBC-ENTMCNC: 29.2 PG — SIGNIFICANT CHANGE UP (ref 27–34)
MCHC RBC-ENTMCNC: 33.1 GM/DL — SIGNIFICANT CHANGE UP (ref 32–36)
MCV RBC AUTO: 88.2 FL — SIGNIFICANT CHANGE UP (ref 80–100)
MONOCYTES # BLD AUTO: 1.1 K/UL — HIGH (ref 0–0.9)
MONOCYTES NFR BLD AUTO: 10.9 % — SIGNIFICANT CHANGE UP (ref 2–14)
NEUTROPHILS # BLD AUTO: 6.1 K/UL — SIGNIFICANT CHANGE UP (ref 1.8–7.4)
NEUTROPHILS NFR BLD AUTO: 60.5 % — SIGNIFICANT CHANGE UP (ref 43–77)
PLATELET # BLD AUTO: 204 K/UL — SIGNIFICANT CHANGE UP (ref 150–400)
POTASSIUM SERPL-MCNC: 4.2 MMOL/L — SIGNIFICANT CHANGE UP (ref 3.5–5.3)
POTASSIUM SERPL-SCNC: 4.2 MMOL/L — SIGNIFICANT CHANGE UP (ref 3.5–5.3)
PROTHROM AB SERPL-ACNC: 14.1 SEC — HIGH (ref 10.6–13.6)
RBC # BLD: 5.61 M/UL — SIGNIFICANT CHANGE UP (ref 4.2–5.8)
RBC # FLD: 13.2 % — SIGNIFICANT CHANGE UP (ref 10.3–14.5)
SODIUM SERPL-SCNC: 137 MMOL/L — SIGNIFICANT CHANGE UP (ref 135–145)
WBC # BLD: 10.08 K/UL — SIGNIFICANT CHANGE UP (ref 3.8–10.5)
WBC # FLD AUTO: 10.08 K/UL — SIGNIFICANT CHANGE UP (ref 3.8–10.5)

## 2020-11-11 PROCEDURE — C1887: CPT

## 2020-11-11 PROCEDURE — 85025 COMPLETE CBC W/AUTO DIFF WBC: CPT

## 2020-11-11 PROCEDURE — C1760: CPT

## 2020-11-11 PROCEDURE — 99152 MOD SED SAME PHYS/QHP 5/>YRS: CPT

## 2020-11-11 PROCEDURE — 93880 EXTRACRANIAL BILAT STUDY: CPT

## 2020-11-11 PROCEDURE — 37221: CPT | Mod: 50

## 2020-11-11 PROCEDURE — 80048 BASIC METABOLIC PNL TOTAL CA: CPT

## 2020-11-11 PROCEDURE — C1769: CPT

## 2020-11-11 PROCEDURE — C1894: CPT

## 2020-11-11 PROCEDURE — 86850 RBC ANTIBODY SCREEN: CPT

## 2020-11-11 PROCEDURE — 99153 MOD SED SAME PHYS/QHP EA: CPT

## 2020-11-11 PROCEDURE — 85610 PROTHROMBIN TIME: CPT

## 2020-11-11 PROCEDURE — 36415 COLL VENOUS BLD VENIPUNCTURE: CPT

## 2020-11-11 PROCEDURE — 75716 ARTERY X-RAYS ARMS/LEGS: CPT | Mod: XU

## 2020-11-11 PROCEDURE — 93005 ELECTROCARDIOGRAM TRACING: CPT

## 2020-11-11 PROCEDURE — 86901 BLOOD TYPING SEROLOGIC RH(D): CPT

## 2020-11-11 PROCEDURE — C1876: CPT

## 2020-11-11 PROCEDURE — 86900 BLOOD TYPING SEROLOGIC ABO: CPT

## 2020-11-11 PROCEDURE — C1725: CPT

## 2020-11-11 PROCEDURE — 85730 THROMBOPLASTIN TIME PARTIAL: CPT

## 2020-11-11 RX ORDER — SODIUM CHLORIDE 9 MG/ML
500 INJECTION, SOLUTION INTRAVENOUS ONCE
Refills: 0 | Status: COMPLETED | OUTPATIENT
Start: 2020-11-11 | End: 2020-11-11

## 2020-11-11 RX ORDER — ACETAMINOPHEN 500 MG
2 TABLET ORAL
Qty: 0 | Refills: 0 | DISCHARGE
Start: 2020-11-11

## 2020-11-11 RX ADMIN — PANTOPRAZOLE SODIUM 40 MILLIGRAM(S): 20 TABLET, DELAYED RELEASE ORAL at 05:18

## 2020-11-11 RX ADMIN — Medication 12.5 MILLIGRAM(S): at 05:18

## 2020-11-11 RX ADMIN — SODIUM CHLORIDE 500 MILLILITER(S): 9 INJECTION, SOLUTION INTRAVENOUS at 05:21

## 2020-11-11 NOTE — DISCHARGE NOTE NURSING/CASE MANAGEMENT/SOCIAL WORK - PATIENT PORTAL LINK FT
You can access the FollowMyHealth Patient Portal offered by [facility] by registering at the following website: [email or web address]. By joining [employer]’s FollowMyHealth portal, you will also be able to view your health information using other applications (apps) compatible with our system.

## 2020-11-11 NOTE — DISCHARGE NOTE NURSING/CASE MANAGEMENT/SOCIAL WORK - NSDCFUADDAPPT_GEN_ALL_CORE_FT
Follow up with Dr. Mercer in one week    No heavy lifting, driving, sex, tub baths, swimming, or any activity that submerges the lower half of the body in water for 48 hours.  Limited walking and stairs for 48 hours.    Change the bandaid after 24 hours and every 24 hours after that.  Keep the puncture site dry and covered with a bandaid until a scab forms.    Observe the site frequently.  If bleeding or a large lump (the size of a golf ball or bigger) occurs lie flat, apply continuous direct pressure just above the puncture site for at least 10 minutes, and notify your physician immediately.  If the bleeding cannot be controlled, call 911 immediately for assistance.  Notify your physician of pain, swelling or any drainage.    Notify your physician immediately if coldness, numbness, discoloration or pain in your foot occurs.

## 2020-11-11 NOTE — PROGRESS NOTE ADULT - SUBJECTIVE AND OBJECTIVE BOX
POSTOP CHECK    HPI/OVERNIGHT EVENTS:    RICHI, no complains. S/p Angiogram with iliac vessels stenting. Uncomplicated. Palpable pulses.     MEDICATIONS  (STANDING):  aspirin enteric coated 81 milliGRAM(s) Oral daily  chlorhexidine 4% Liquid 1 Application(s) Topical once  clopidogrel Tablet 75 milliGRAM(s) Oral daily  isosorbide   mononitrate ER Tablet (IMDUR) 30 milliGRAM(s) Oral daily  metoprolol tartrate 12.5 milliGRAM(s) Oral two times a day  pantoprazole    Tablet 40 milliGRAM(s) Oral before breakfast  simvastatin 40 milliGRAM(s) Oral at bedtime    MEDICATIONS  (PRN):  acetaminophen   Tablet .. 650 milliGRAM(s) Oral every 6 hours PRN Mild Pain (1 - 3)  oxycodone    5 mG/acetaminophen 325 mG 1 Tablet(s) Oral every 6 hours PRN Moderate Pain (4 - 6)  zolpidem 5 milliGRAM(s) Oral at bedtime PRN Insomnia      Vital Signs Last 24 Hrs  T(C): 36.6 (10 Nov 2020 20:38), Max: 36.6 (10 Nov 2020 20:35)  T(F): 97.8 (10 Nov 2020 20:38), Max: 97.8 (10 Nov 2020 20:35)  HR: 103 (11 Nov 2020 02:07) (72 - 109)  BP: 120/68 (11 Nov 2020 02:07) (116/72 - 154/84)  BP(mean): --  RR: 17 (10 Nov 2020 20:38) (17 - 18)  SpO2: 97% (10 Nov 2020 20:38) (93% - 97%)    Constitutional: patient resting comfortably in bed, in no acute distress  HEENT: EOMI / PERRL b/l, no active drainage or redness  Neck: No JVD, full ROM without pain  Respiratory: respirations are unlabored, no accessory muscle use, no conversational dyspnea  Cardiovascular: regular rate & rhythm  Gastrointestinal: Abdomen soft, non-tender, non-distended, no rebound tenderness / guarding  Neurological: GCS: 15 (4/5/6). A&O x 3; no gross sensory / motor / coordination deficits  Psychiatric: Normal mood, normal affect  Musculoskeletal: No joint pain, swelling or deformity; no limitation of movement  Vascular: pulses palpable bilateral DP and PT. no poikilothermia no sensitive or motor deficits.       I&O's Detail    10 Nov 2020 07:01  -  11 Nov 2020 04:53  --------------------------------------------------------  IN:    Oral Fluid: 60 mL  Total IN: 60 mL    OUT:    Voided (mL): 100 mL  Total OUT: 100 mL    Total NET: -40 mL          LABS:                        15.9   8.51  )-----------( 209      ( 10 Nov 2020 08:17 )             49.3     11-10    137  |  102  |  13.0  ----------------------------<  119<H>  4.3   |  24.0  |  0.73    Ca    9.2      10 Nov 2020 08:17      PT/INR - ( 10 Nov 2020 08:17 )   PT: 16.8 sec;   INR: 1.48 ratio         PTT - ( 10 Nov 2020 08:17 )  PTT:34.9 sec

## 2020-11-11 NOTE — PROGRESS NOTE ADULT - ASSESSMENT
70 M S/p Angiogram with Aortogram with CORDELL stent and LCI stent. Bilat [place] used. Uncomplicated.     Continue heparin drip.   Pain management  PTT theraapeutic  Vascular checks q4h

## 2020-11-19 ENCOUNTER — APPOINTMENT (OUTPATIENT)
Dept: VASCULAR SURGERY | Facility: CLINIC | Age: 70
End: 2020-11-19
Payer: MEDICAID

## 2020-11-19 VITALS
WEIGHT: 197 LBS | BODY MASS INDEX: 33.63 KG/M2 | DIASTOLIC BLOOD PRESSURE: 80 MMHG | TEMPERATURE: 97.2 F | HEART RATE: 99 BPM | SYSTOLIC BLOOD PRESSURE: 127 MMHG | HEIGHT: 64 IN | OXYGEN SATURATION: 94 %

## 2020-11-19 PROCEDURE — 99024 POSTOP FOLLOW-UP VISIT: CPT

## 2020-11-19 NOTE — ASSESSMENT
[FreeTextEntry1] : 70 year old male with PAD and lifestyle limiting left leg claudication secondary to left common iliac and external iliac occlusion. \par He is S/P bilat iliac artery stenting and BLE are well perfused.\par He continues to smoke and smoking cessation was strongly advised.\par He was advised to continue his current medical regimen including ASA/Plavix and statin\par He was advised to follow up with Dr Jean re: exertional fatigue\par He will follow up in the office in 3 months for BLE arterial duplex as well as bilat carotid duplex with h/o carotid stenosis for routine surveillance.

## 2020-11-19 NOTE — REASON FOR VISIT
[Post Op: _________] : a [unfilled] post op visit [Family Member] : family member [FreeTextEntry1] : S/P bilat iliac artery stenting 11/10/20

## 2020-11-19 NOTE — PHYSICAL EXAM
[JVD] : no jugular venous distention  [Carotid Bruits] : no carotid bruits [Normal Breath Sounds] : Normal breath sounds [Normal Heart Sounds] : normal heart sounds [2+] : left 2+ [Ankle Swelling (On Exam)] : not present [Varicose Veins Of Lower Extremities] : not present [] : not present [Abdomen Masses] : No abdominal masses [Abdomen Tenderness] : ~T ~M No abdominal tenderness [No Rash or Lesion] : No rash or lesion [Alert] : alert [Oriented to Person] : oriented to person [Oriented to Place] : oriented to place [Oriented to Time] : oriented to time [de-identified] : Well appearing [de-identified] : NCAT, PERRL [FreeTextEntry1] : Both feet warm, well perfused. Bilat groin access sites with small areas of fibrosis without ecchymosis or hematoma

## 2020-11-19 NOTE — HISTORY OF PRESENT ILLNESS
[FreeTextEntry1] : 70 year old male with CAD and severe bilateral LE PAD presents for evaluation for left leg claudication. He previous heavy smoker but quit following recent right leg percutaneous revascularization.\par Patient has a long standing history of PAD and bilateral thigh claudication but has recently progressed to become lifestyle limiting. He is unable to walk > 10 steps before having to stop due to thigh pain.\par He denies rest pain or tissue loss.\par He is S/P aortobiiliac angiogram by Dr Jean  that revealed focal stenoses of the right common iliac and external iliac arteries that were successfully treated with covered stents. He was also noted to have flush occlusion of the left common iliac with reconstitution of his left CFA. His left SFA and profunda are patent. Following that intervention, his right leg claudication had completely resolved but he continued to have lifestyle limiting symptoms in his left leg.\par  [de-identified] : He was taken to the cath lab on 11/10/20 by Dr Mercer and underwent aortogram with R external iliac artery angioplasty and stent as well as left common iliac artery angioplasty and stenting. He tolerated the procedure well and was discharged the following day after resuming his anticoagulation. Since his discharge, he has felt well. He reports he is ambulating without  any c/o claudication but does not overall fatigue with activity. He denies CP or SOB. He has continued to smoke and reports compliance with his medical regimen.

## 2020-12-14 RX ORDER — CILOSTAZOL 50 MG/1
50 TABLET ORAL
Qty: 60 | Refills: 3 | Status: ACTIVE | COMMUNITY
Start: 2020-08-12 | End: 1900-01-01

## 2021-02-25 ENCOUNTER — APPOINTMENT (OUTPATIENT)
Dept: VASCULAR SURGERY | Facility: CLINIC | Age: 71
End: 2021-02-25

## 2021-03-08 ENCOUNTER — APPOINTMENT (OUTPATIENT)
Dept: VASCULAR SURGERY | Facility: CLINIC | Age: 71
End: 2021-03-08
Payer: MEDICARE

## 2021-03-08 VITALS
HEIGHT: 64 IN | DIASTOLIC BLOOD PRESSURE: 80 MMHG | HEART RATE: 106 BPM | OXYGEN SATURATION: 97 % | WEIGHT: 190 LBS | BODY MASS INDEX: 32.44 KG/M2 | SYSTOLIC BLOOD PRESSURE: 139 MMHG

## 2021-03-08 DIAGNOSIS — I73.9 PERIPHERAL VASCULAR DISEASE, UNSPECIFIED: ICD-10-CM

## 2021-03-08 DIAGNOSIS — G89.29 LUMBAGO WITH SCIATICA, RIGHT SIDE: ICD-10-CM

## 2021-03-08 DIAGNOSIS — M54.41 LUMBAGO WITH SCIATICA, RIGHT SIDE: ICD-10-CM

## 2021-03-08 PROCEDURE — 99072 ADDL SUPL MATRL&STAF TM PHE: CPT

## 2021-03-08 PROCEDURE — 93923 UPR/LXTR ART STDY 3+ LVLS: CPT

## 2021-03-08 PROCEDURE — 99213 OFFICE O/P EST LOW 20 MIN: CPT

## 2021-03-10 ENCOUNTER — APPOINTMENT (OUTPATIENT)
Dept: CARDIOLOGY | Facility: CLINIC | Age: 71
End: 2021-03-10

## 2021-03-10 NOTE — HISTORY OF PRESENT ILLNESS
[FreeTextEntry1] : A 71 y/o male with PMhx of CAD previously followed by Dr. Bañuelos  , who was referred to Dr. Jean by Dr. Gonzalez for bilateral claudication . He c/o of 1 year of right LE claudications (bilateral leg weakness , pain and inability to walk ) followed by Left LE claudications getting progressively worse , now happens with 5-10 steps \par U/S showed bilateral moderate to severe SFA stenosis. AF on ac with wafarin. \par \par Found to have severe PAD, s/p aortogram with R external iliac artery angioplasty and stent as well as left common iliac artery angioplasty and stenting 11/10/20. BENJAMIN/ PVR performed by vascular normal.Cardiac cath revealed dLAD, priximal and mid CX diffuse disease and RCA . He was complaining of tenderness to right lumbar spine- orthopedic/neurology referral made.  \par \par INCOMPLETE NOTE NOT YET SEEN

## 2021-04-06 ENCOUNTER — NON-APPOINTMENT (OUTPATIENT)
Age: 71
End: 2021-04-06

## 2021-04-06 ENCOUNTER — APPOINTMENT (OUTPATIENT)
Dept: OPHTHALMOLOGY | Facility: CLINIC | Age: 71
End: 2021-04-06
Payer: MEDICARE

## 2021-04-06 PROCEDURE — 92134 CPTRZ OPH DX IMG PST SGM RTA: CPT

## 2021-04-06 PROCEDURE — 92004 COMPRE OPH EXAM NEW PT 1/>: CPT

## 2021-04-09 ENCOUNTER — APPOINTMENT (OUTPATIENT)
Dept: CARDIOLOGY | Facility: CLINIC | Age: 71
End: 2021-04-09

## 2021-04-09 VITALS
WEIGHT: 203 LBS | HEIGHT: 64 IN | HEART RATE: 88 BPM | DIASTOLIC BLOOD PRESSURE: 68 MMHG | SYSTOLIC BLOOD PRESSURE: 98 MMHG | OXYGEN SATURATION: 98 % | BODY MASS INDEX: 34.66 KG/M2 | TEMPERATURE: 97.6 F

## 2021-04-09 RX ORDER — WARFARIN 4 MG/1
4 TABLET ORAL DAILY
Qty: 30 | Refills: 0 | Status: ACTIVE | COMMUNITY
Start: 2021-04-09

## 2022-02-28 ENCOUNTER — APPOINTMENT (OUTPATIENT)
Dept: VASCULAR SURGERY | Facility: CLINIC | Age: 72
End: 2022-02-28
Payer: MEDICARE

## 2022-02-28 VITALS — DIASTOLIC BLOOD PRESSURE: 79 MMHG | HEART RATE: 106 BPM | SYSTOLIC BLOOD PRESSURE: 119 MMHG | OXYGEN SATURATION: 97 %

## 2022-02-28 DIAGNOSIS — I73.9 PERIPHERAL VASCULAR DISEASE, UNSPECIFIED: ICD-10-CM

## 2022-02-28 PROCEDURE — 99406 BEHAV CHNG SMOKING 3-10 MIN: CPT

## 2022-02-28 PROCEDURE — 93923 UPR/LXTR ART STDY 3+ LVLS: CPT

## 2022-02-28 PROCEDURE — 99213 OFFICE O/P EST LOW 20 MIN: CPT

## 2022-02-28 NOTE — ASSESSMENT
[FreeTextEntry1] : 71 year old man who underwent bilateral iliac stenting for lifestyle limiting claudication in 2020.\par He continued to smoke heavily. \par He now returns with worsening bilateral lower extremity claudication\par -BENJAMIN/PVR performed today shows normal PVR waveforms. BENJAMIN is normal (R 1.05, L 1.00)\par -His claudication is likely neurogenic. I have advised him to consider neurology eval if it persists\par -I have also counselled him on the importance of smoking cessation\par -RTO 6months

## 2022-02-28 NOTE — HISTORY OF PRESENT ILLNESS
[FreeTextEntry1] : 71 year old male with CAD and severe bilateral LE PAD presents for evaluation for left leg claudication. He previous heavy smoker but quit following recent right leg percutaneous revascularization.\par Patient has a long standing history of PAD and bilateral thigh claudication but has recently progressed to become lifestyle limiting. He is unable to walk > 10 steps before having to stop due to thigh pain.\par He denies rest pain or tissue loss.\par He recently underwent recent aortobiiliac angiogram by Dr Jean 1 week ago that revealed focal stenoses of the right common iliac and external iliac arteries that were successfully treated with covered stents. He was also noted to have flush occlusion of the left common iliac and external iliac arteries with reconstitution of his left CFA. His left SFA and profunda are patent.\par Since intervention, his right leg claudication has completely resolved but he continues to have lifestyle limiting symptoms in his left leg.\par He denies chest pain, effort intolerance, MUÑOZ or orthopnea [de-identified] : He is s/p R external iliac artery angioplasty and stent as well as left common iliac artery angioplasty on 11/10/2020\par Patient continues to smoke heavily\par He reports that over the last 2 months he has developed bilateral calf short distance claudication as well as numbness in his feet at rest

## 2022-03-08 ENCOUNTER — APPOINTMENT (OUTPATIENT)
Dept: VASCULAR SURGERY | Facility: CLINIC | Age: 72
End: 2022-03-08

## 2022-07-21 ENCOUNTER — APPOINTMENT (OUTPATIENT)
Dept: OPHTHALMOLOGY | Facility: CLINIC | Age: 72
End: 2022-07-21

## 2022-07-21 ENCOUNTER — NON-APPOINTMENT (OUTPATIENT)
Age: 72
End: 2022-07-21

## 2022-07-21 PROCEDURE — 92014 COMPRE OPH EXAM EST PT 1/>: CPT

## 2022-07-21 PROCEDURE — 92134 CPTRZ OPH DX IMG PST SGM RTA: CPT

## 2023-08-18 ENCOUNTER — APPOINTMENT (OUTPATIENT)
Dept: VASCULAR SURGERY | Facility: CLINIC | Age: 73
End: 2023-08-18

## 2024-07-02 ENCOUNTER — APPOINTMENT (OUTPATIENT)
Dept: VASCULAR SURGERY | Facility: CLINIC | Age: 74
End: 2024-07-02
Payer: MEDICARE

## 2024-07-02 ENCOUNTER — APPOINTMENT (OUTPATIENT)
Dept: VASCULAR SURGERY | Facility: CLINIC | Age: 74
End: 2024-07-02

## 2024-07-02 VITALS
BODY MASS INDEX: 34.66 KG/M2 | HEIGHT: 64 IN | OXYGEN SATURATION: 96 % | SYSTOLIC BLOOD PRESSURE: 120 MMHG | RESPIRATION RATE: 16 BRPM | DIASTOLIC BLOOD PRESSURE: 70 MMHG | WEIGHT: 203 LBS | TEMPERATURE: 98.8 F | HEART RATE: 92 BPM

## 2024-07-02 PROCEDURE — 93925 LOWER EXTREMITY STUDY: CPT

## 2024-07-02 PROCEDURE — 93923 UPR/LXTR ART STDY 3+ LVLS: CPT

## 2024-07-02 PROCEDURE — 99213 OFFICE O/P EST LOW 20 MIN: CPT

## 2024-07-23 ENCOUNTER — APPOINTMENT (OUTPATIENT)
Dept: PULMONOLOGY | Facility: CLINIC | Age: 74
End: 2024-07-23
Payer: MEDICARE

## 2024-07-23 VITALS
DIASTOLIC BLOOD PRESSURE: 62 MMHG | SYSTOLIC BLOOD PRESSURE: 108 MMHG | HEART RATE: 87 BPM | WEIGHT: 203 LBS | HEIGHT: 64 IN | RESPIRATION RATE: 16 BRPM | OXYGEN SATURATION: 98 % | BODY MASS INDEX: 34.66 KG/M2

## 2024-07-23 DIAGNOSIS — R91.8 OTHER NONSPECIFIC ABNORMAL FINDING OF LUNG FIELD: ICD-10-CM

## 2024-07-23 DIAGNOSIS — R06.02 SHORTNESS OF BREATH: ICD-10-CM

## 2024-07-23 PROCEDURE — 99204 OFFICE O/P NEW MOD 45 MIN: CPT

## 2024-07-23 PROCEDURE — G2211 COMPLEX E/M VISIT ADD ON: CPT

## 2024-07-23 RX ORDER — ALBUTEROL SULFATE 90 UG/1
108 (90 BASE) INHALANT RESPIRATORY (INHALATION)
Qty: 1 | Refills: 5 | Status: ACTIVE | COMMUNITY
Start: 2024-07-23 | End: 1900-01-01

## 2024-07-23 NOTE — ASSESSMENT
[FreeTextEntry1] : 74M PMH former smoker, CAD, severe B/L PAD who presents for initial pulmonary evaluation.   - Recent CT chest (R) 6/26/24 showing 3.4 x 2.4cm irregular focal opacity in posterior segment of RUL.  - He reports his cough has resolved - Given smoking history of recurrence of coughing episodes x 6 months, concern for underlying malignancy - Will check stat PET-CT - Will have him return first available appt for full PFT - Will do trial of albuterol as he does get SOB with mild exertion - ?underlying COPD  The patient expressed understanding and agreement with the plan as outlined above and accepts responsibility to be compliant with any recommended testing, treatment, and follow-up visits.  All relevant questions and concerns were addressed.  45 minutes of time were spent on the encounter. Medical records were reviewed, including but not limited to hospital records, outpatient records, laboratory data, and diagnostic imaging studies. Greater than 50% of the face-to-face encounter time was spent on counseling and/or coordination of care.  Eulalio Holm MD, [facility] Pulmonary & Critical Care Medicine [facility] [place] [address] [place] [address] T: [phone] F: [phone]

## 2024-07-23 NOTE — RESULTS/DATA
[TextEntry] : [facility] Exam requested by: MARTIN BUSTAMANTE  [address] [facility] [place] [address] SITE PERFORMED: [facility] PATCHOGUE Patient: REBEL CAMARILLO YOB: 1950 Phone: [phone] MRN: [id number] Acc: [id number] Date of Exam: 06-   EXAM:  CT CHEST WITHOUT CONTRAST  Note - This patient has received 0 CT studies and 0 Myocardial Perfusion studies within our network over the previous 12 month period.  HISTORY:  One week history of hemoptysis.  TECHNIQUE: CT of the chest is performed. Contrast Technique: Without  Range/Planes: [place] of lungs to the adrenal glands. Axial, coronal, and sagittal.  One or more of the following dose reduction techniques were used: automated exposure control, adjustment of the mA and/or kV according to patient size, use of iterative reconstruction technique.   COMPARISON:  None  FINDINGS: CHEST  THYROID: Visualized portion is unremarkable.  LUNGS: Focal focal patchy irregular opacity in the posterior segment of the right upper lobe with air bronchograms measuring 3.4 x 1.2 x 2.4 cm, (76:4) and (67:608) which may be inflammatory in or possibly neoplastic.  Patchy groundglass infiltrates are demonstrated in the periphery of both both upper lobes left greater than the right. Scarring is demonstrated in the lingula and left lower lobe. Bronchial wall thickening is demonstrated bilaterally.  Occasional micronodules bilaterally. Calcified granuloma right apex  TRACHEA AND BRONCHI: No large endobronchial lesion identified.  PLEURA: No evidence of pleural effusions or pleural-based masses.  HEART AND PERICARDIUM: Normal size without pericardial effusion. There is extensive calcification of the coronary vessels.  VASCULATURE: Very mild ectasia of the ascending aorta measuring 3.6 cm.  LYMPH NODES AND MEDIASTINUM: Subcentimeter mediastinal lymph nodes measuring up to 0.6 cm and no evidence of hilar adenopathy. Calcified right hilar lymph nodes which may be due to old tuberculosis.  SOFT TISSUES: Unremarkable.  BONES: Multilevel degenerative changes thoracic spine.  FINDINGS: VISUALIZED PORTION OF THE ABDOMEN  GASTROESOPHAGEAL JUNCTION: Unremarkable. OTHER ORGANS: Mild atrophy of the pancreas. Layering calcified gallstone  IMPRESSION:   1. 3.4 x 2.4 cm irregular focal opacity in the posterior segment of the right upper lobe with air bronchograms most likely focal pneumonia however neoplasm is not excluded consider PET/CT 2. Patchy groundglass infiltrates in the periphery of both upper lobes left greater in the right. 3. Bronchial wall thickening bilaterally which is inflammatory. Scarring bilaterally 4. Calcified right hilar lymph nodes which may be due to old tuberculosis. Calcified granuloma right apex apex 5. Cholelithiasis  Thank you for the opportunity to participate in the care of this patient.     DEXTER JHAVERI MD  - Electronically Signed: 07- 12:19 PM  Physician to Physician Direct Line is: [phone]  ~~~~~~~~~~~~~~~~~~~~~~~~~~~~~~~~~~~~~~~~~~~~~~~~~~~~~~~~~~~~~~~~~~~~~~~~  [facility] [id number] EXAM: RAD 7019 _ XR CHEST PORTABLE IMM    PROCEDURE DATE: Jan 11 2024   AP erect chest on January 11, 2024 at 12:42 PM. Patient has chest pain.  COMPARISON: None available.  Heart magnified by technique.  Lungs are grossly clear.  IMPRESSION: Grossly clear lungs at this time.  --- End of Report ---       RUBIO RAHMAN MD; Attending Radiologist This document has been electronically signed. Jan 11 2024 3:51PM  ~~~~~~~~~~~~~~~~~~~~~~~~~~~~~~~~~~~~~~~~~~~~~~~~~~~~~~~~~~~~~~~~~~~~~~~~

## 2024-08-09 ENCOUNTER — NON-APPOINTMENT (OUTPATIENT)
Age: 74
End: 2024-08-09

## 2024-09-13 NOTE — DISCHARGE NOTE PROVIDER - NSCORESITESY/N_GEN_A_CORE_RD
[FreeTextEntry1] : Xrays reviewed with patient Treatment options disussed Tall cam applied today -  medically necessary for stability / protected weight bearing over the counter anti-inflammatories as needed for pain Discussed ice and elevation Follow up with foot/ankle specialist in 1 week  No

## 2024-09-19 ENCOUNTER — APPOINTMENT (OUTPATIENT)
Dept: PULMONOLOGY | Facility: CLINIC | Age: 74
End: 2024-09-19

## 2024-10-29 ENCOUNTER — APPOINTMENT (OUTPATIENT)
Dept: ORTHOPEDIC SURGERY | Facility: CLINIC | Age: 74
End: 2024-10-29
Payer: MEDICARE

## 2024-10-29 VITALS
WEIGHT: 203 LBS | DIASTOLIC BLOOD PRESSURE: 55 MMHG | HEIGHT: 64 IN | SYSTOLIC BLOOD PRESSURE: 89 MMHG | BODY MASS INDEX: 34.66 KG/M2 | HEART RATE: 88 BPM

## 2024-10-29 DIAGNOSIS — M48.062 SPINAL STENOSIS, LUMBAR REGION WITH NEUROGENIC CLAUDICATION: ICD-10-CM

## 2024-10-29 PROCEDURE — 99204 OFFICE O/P NEW MOD 45 MIN: CPT

## 2025-07-01 ENCOUNTER — APPOINTMENT (OUTPATIENT)
Dept: VASCULAR SURGERY | Facility: CLINIC | Age: 75
End: 2025-07-01